# Patient Record
Sex: MALE | Race: WHITE | Employment: OTHER | URBAN - METROPOLITAN AREA
[De-identification: names, ages, dates, MRNs, and addresses within clinical notes are randomized per-mention and may not be internally consistent; named-entity substitution may affect disease eponyms.]

---

## 2017-12-05 ENCOUNTER — ANESTHESIA EVENT (OUTPATIENT)
Dept: ENDOSCOPY | Age: 72
End: 2017-12-05
Payer: MEDICARE

## 2017-12-06 ENCOUNTER — HOSPITAL ENCOUNTER (OUTPATIENT)
Age: 72
Setting detail: OUTPATIENT SURGERY
Discharge: HOME | End: 2017-12-06
Attending: INTERNAL MEDICINE | Admitting: INTERNAL MEDICINE
Payer: MEDICARE

## 2017-12-06 ENCOUNTER — ANESTHESIA (OUTPATIENT)
Dept: ENDOSCOPY | Age: 72
End: 2017-12-06
Payer: MEDICARE

## 2017-12-06 VITALS
HEIGHT: 72 IN | TEMPERATURE: 97 F | WEIGHT: 173 LBS | DIASTOLIC BLOOD PRESSURE: 77 MMHG | RESPIRATION RATE: 16 BRPM | BODY MASS INDEX: 23.43 KG/M2 | SYSTOLIC BLOOD PRESSURE: 109 MMHG | OXYGEN SATURATION: 98 % | HEART RATE: 60 BPM

## 2017-12-06 PROCEDURE — 77030009426 HC FCPS BIOP ENDOSC BSC -B: Performed by: INTERNAL MEDICINE

## 2017-12-06 PROCEDURE — 74011250636 HC RX REV CODE- 250/636: Performed by: NURSE ANESTHETIST, CERTIFIED REGISTERED

## 2017-12-06 PROCEDURE — 77030031670 HC DEV INFL ENDOTEK BIG60 MRTM -B: Performed by: INTERNAL MEDICINE

## 2017-12-06 PROCEDURE — 88305 TISSUE EXAM BY PATHOLOGIST: CPT | Performed by: INTERNAL MEDICINE

## 2017-12-06 PROCEDURE — 77030010936 HC CLP LIG BSC -C: Performed by: INTERNAL MEDICINE

## 2017-12-06 PROCEDURE — 76040000007: Performed by: INTERNAL MEDICINE

## 2017-12-06 PROCEDURE — 74011250636 HC RX REV CODE- 250/636

## 2017-12-06 PROCEDURE — 77030011640 HC PAD GRND REM COVD -A: Performed by: INTERNAL MEDICINE

## 2017-12-06 PROCEDURE — 76060000032 HC ANESTHESIA 0.5 TO 1 HR: Performed by: INTERNAL MEDICINE

## 2017-12-06 PROCEDURE — 77030034690 HC DEV ENDO SNR RETRV STRC -B: Performed by: INTERNAL MEDICINE

## 2017-12-06 RX ORDER — SODIUM CHLORIDE 0.9 % (FLUSH) 0.9 %
5-10 SYRINGE (ML) INJECTION AS NEEDED
Status: CANCELLED | OUTPATIENT
Start: 2017-12-06 | End: 2017-12-06

## 2017-12-06 RX ORDER — SODIUM CHLORIDE 0.9 % (FLUSH) 0.9 %
5-10 SYRINGE (ML) INJECTION EVERY 8 HOURS
Status: DISCONTINUED | OUTPATIENT
Start: 2017-12-06 | End: 2017-12-06 | Stop reason: HOSPADM

## 2017-12-06 RX ORDER — EPINEPHRINE 0.1 MG/ML
1 INJECTION INTRACARDIAC; INTRAVENOUS
Status: CANCELLED | OUTPATIENT
Start: 2017-12-06 | End: 2017-12-06

## 2017-12-06 RX ORDER — FLUMAZENIL 0.1 MG/ML
0.2 INJECTION INTRAVENOUS
Status: CANCELLED | OUTPATIENT
Start: 2017-12-06 | End: 2017-12-06

## 2017-12-06 RX ORDER — ATROPINE SULFATE 0.1 MG/ML
0.5 INJECTION INTRAVENOUS
Status: CANCELLED | OUTPATIENT
Start: 2017-12-06 | End: 2017-12-06

## 2017-12-06 RX ORDER — FAMOTIDINE 20 MG/1
20 TABLET, FILM COATED ORAL ONCE
Status: DISCONTINUED | OUTPATIENT
Start: 2017-12-07 | End: 2017-12-06 | Stop reason: HOSPADM

## 2017-12-06 RX ORDER — NALOXONE HYDROCHLORIDE 0.4 MG/ML
0.4 INJECTION, SOLUTION INTRAMUSCULAR; INTRAVENOUS; SUBCUTANEOUS
Status: CANCELLED | OUTPATIENT
Start: 2017-12-06 | End: 2017-12-06

## 2017-12-06 RX ORDER — SODIUM CHLORIDE, SODIUM LACTATE, POTASSIUM CHLORIDE, CALCIUM CHLORIDE 600; 310; 30; 20 MG/100ML; MG/100ML; MG/100ML; MG/100ML
50 INJECTION, SOLUTION INTRAVENOUS CONTINUOUS
Status: DISCONTINUED | OUTPATIENT
Start: 2017-12-07 | End: 2017-12-06 | Stop reason: HOSPADM

## 2017-12-06 RX ORDER — SODIUM CHLORIDE 0.9 % (FLUSH) 0.9 %
5-10 SYRINGE (ML) INJECTION EVERY 8 HOURS
Status: CANCELLED | OUTPATIENT
Start: 2017-12-06 | End: 2017-12-06

## 2017-12-06 RX ORDER — DEXTROMETHORPHAN/PSEUDOEPHED 2.5-7.5/.8
1.2 DROPS ORAL
Status: CANCELLED | OUTPATIENT
Start: 2017-12-06

## 2017-12-06 RX ORDER — INSULIN LISPRO 100 [IU]/ML
INJECTION, SOLUTION INTRAVENOUS; SUBCUTANEOUS ONCE
Status: DISCONTINUED | OUTPATIENT
Start: 2017-12-07 | End: 2017-12-06 | Stop reason: HOSPADM

## 2017-12-06 RX ORDER — ESCITALOPRAM OXALATE 10 MG/1
10 TABLET ORAL DAILY
COMMUNITY
End: 2018-01-11 | Stop reason: DRUGHIGH

## 2017-12-06 RX ORDER — PROPOFOL 10 MG/ML
INJECTION, EMULSION INTRAVENOUS AS NEEDED
Status: DISCONTINUED | OUTPATIENT
Start: 2017-12-06 | End: 2017-12-06 | Stop reason: HOSPADM

## 2017-12-06 RX ORDER — SODIUM CHLORIDE 0.9 % (FLUSH) 0.9 %
5-10 SYRINGE (ML) INJECTION AS NEEDED
Status: DISCONTINUED | OUTPATIENT
Start: 2017-12-06 | End: 2017-12-06 | Stop reason: HOSPADM

## 2017-12-06 RX ADMIN — PROPOFOL 20 MG: 10 INJECTION, EMULSION INTRAVENOUS at 10:31

## 2017-12-06 RX ADMIN — PROPOFOL 20 MG: 10 INJECTION, EMULSION INTRAVENOUS at 10:38

## 2017-12-06 RX ADMIN — PROPOFOL 120 MG: 10 INJECTION, EMULSION INTRAVENOUS at 10:11

## 2017-12-06 RX ADMIN — PROPOFOL 20 MG: 10 INJECTION, EMULSION INTRAVENOUS at 10:26

## 2017-12-06 RX ADMIN — PROPOFOL 20 MG: 10 INJECTION, EMULSION INTRAVENOUS at 10:14

## 2017-12-06 RX ADMIN — SODIUM CHLORIDE, SODIUM LACTATE, POTASSIUM CHLORIDE, AND CALCIUM CHLORIDE 50 ML/HR: 600; 310; 30; 20 INJECTION, SOLUTION INTRAVENOUS at 09:38

## 2017-12-06 NOTE — DISCHARGE INSTRUCTIONS
Patient Discharge Instructions    Nehemiah Maldonado / [id number] : 1945    Admitted 2017 Discharged: 2017         Procedure Impression:  1. Two 6mm ascending colon polyps removed  2. One 3mm transverse colon polyp removed  3. One 15mm pedunculated sigmoid polyp removed with clips placed across stalk  4. Four diminutive rectosigmoid polyps removed  5. Moderate pan-colonic diverticulosis  6. Large prolapsed internal hemorrhoids with 1cm anal polyp noted    Recommendation:  1. Resume regular diet, recommend high fiber  2. Will contact with polyp results in 2 weeks. These results will determine timing to next colon cancer screening. 3. Please contact our office if you have not received the results by three weeks. 4. Refer to Dr Marquita Espinosa for evaluation of the anal polyp    Recommended Diet: Regular Diet    Recommended Activity:    1. Do not drink alcohol, drive or operate machinery for 12 hours   2. Call if any fever, abdominal pain or bleeding noted. Signed By: Baudilio Perdomo MD     2017       Patient armband removed and given to patient to take home.   Patient was informed of the privacy risks if armband lost or stolen

## 2017-12-06 NOTE — IP AVS SNAPSHOT
Andrea Lopez 
 
 
 [address] 
[phone] Patient: Oriana Reyes MRN: [id number] FCG:3/1/7100 About your hospitalization You were admitted on:  December 6, 2017 You last received care in the:  [facility] PHASE 2 RECOVERY You were discharged on:  December 6, 2017 Why you were hospitalized Your primary diagnosis was:  Not on File Things You Need To Do (next 8 weeks) Call Sabas Epstein MD in 2 week(s) Phone:  [phone] Where:  [facility], [address] [facility] [address], [address] [address] Follow up with Yair Crabtree MD  
  
Phone:  [phone] Where:  [address], [address] [place] [address] Thursday Jan 11, 2018 Any with Navneet Chance MD at  1:45 PM  
Where:  [facility]. [address] [facility] Discharge Orders None A check stephanie indicates which time of day the medication should be taken. My Medications TAKE these medications as instructed Instructions Each Dose to Equal  
 Morning Noon Evening Bedtime  
 amLODIPine 10 mg tablet Commonly known as:  [place] Your last dose was: Your next dose is: Take  by mouth daily. aspirin delayed-release 81 mg tablet Your last dose was: Your next dose is: Take  by mouth daily. atorvastatin 20 mg tablet Commonly known as:  LIPITOR Your last dose was: Your next dose is: Take  by mouth daily. escitalopram oxalate 10 mg tablet Commonly known as:  [place]ty [place] Your last dose was: Your next dose is: Take 10 mg by mouth daily. 10 mg  
    
   
   
   
  
 folic acid 740 mcg tablet Your last dose was: Your next dose is: Take 400 mcg by mouth daily. 400 mcg lisinopril 40 mg tablet Commonly known as:  Ernesto Tafoya Your last dose was: Your next dose is: Take 40 mg by mouth daily. 40 mg  
    
   
   
   
  
 multivitamin with iron tablet Your last dose was: Your next dose is: Take 1 Tab by mouth daily. 1 Tab Discharge Instructions Patient Discharge Instructions Priscila Cornell / [id number] : 1945 Admitted 2017 Discharged: 2017 Procedure Impression: 1. Two 6mm ascending colon polyps removed 2. One 3mm transverse colon polyp removed 3. One 15mm pedunculated sigmoid polyp removed with clips placed across stalk 4. Four diminutive rectosigmoid polyps removed 5. Moderate pan-colonic diverticulosis 6. Large prolapsed internal hemorrhoids with 1cm anal polyp noted Recommendation: 1. Resume regular diet, recommend high fiber 2. Will contact with polyp results in 2 weeks. These results will determine timing to next colon cancer screening. 3. Please contact our office if you have not received the results by three weeks. 4. Refer to Dr Tootie Berger for evaluation of the anal polyp Recommended Diet: Regular Diet Recommended Activity: 1. Do not drink alcohol, drive or operate machinery for 12 hours 2. Call if any fever, abdominal pain or bleeding noted. Signed By: Sedrick Murray MD   
 2017 Patient armband removed and given to patient to take home. Patient was informed of the privacy risks if armband lost or stolen Introducing [facility] & HEALTH SERVICES! Aleksandra Novak introduces Dashbook patient portal. Now you can access parts of your medical record, email your doctor's office, and request medication refills online. 1. In your internet browser, go to [email or web address] 2. Click on the First Time User? Click Here link in the Sign In box. You will see the New Member Sign Up page. 3. Enter your De Correspondent Access Code exactly as it appears below. You will not need to use this code after youve completed the sign-up process. If you do not sign up before the expiration date, you must request a new code. · De Correspondent Access Code: [email or web address] Expires: 3/6/2018 11:05 AM 
 
4. Enter the last four digits of your Social Security Number (xxxx) and Date of Birth (mm/dd/yyyy) as indicated and click Submit. You will be taken to the next sign-up page. 5. Create a Alloptict ID. This will be your De Correspondent login ID and cannot be changed, so think of one that is secure and easy to remember. 6. Create a De Correspondent password. You can change your password at any time. 7. Enter your Password Reset Question and Answer. This can be used at a later time if you forget your password. 8. Enter your e-mail address. You will receive e-mail notification when new information is available in [address] 9. Click Sign Up. You can now view and download portions of your medical record. 10. Click the Download Summary menu link to download a portable copy of your medical information. If you have questions, please visit the Frequently Asked Questions section of the De Correspondent website. Remember, De Correspondent is NOT to be used for urgent needs. For medical emergencies, dial 911. Now available from your iPhone and Android! Providers Seen During Your Hospitalization Provider Specialty Primary office phone Machelle Early MD Gastroenterology [phone] Your Primary Care Physician (PCP) Primary Care Physician Office Phone Office Fax Cheryle Beal, [place] [address] [phone] You are allergic to the following No active allergies Recent Documentation Height Weight BMI Smoking Status 1.829 m 78.5 kg 23.46 kg/m2 Former Smoker Emergency Contacts Name Discharge Info Relation Home Work Mobile MitaDrea DISCHARGE CAREGIVER [3] Daughter [21] [phone] Patient Belongings The following personal items are in your possession at time of discharge: 
  Dental Appliances: Partials, Lowers, Uppers, With patient  Visual Aid: Glasses, With patient Please provide this summary of care documentation to your next provider. Signatures-by signing, you are acknowledging that this After Visit Summary has been reviewed with you and you have received a copy. Patient Signature:  ____________________________________________________________ Date:  ____________________________________________________________  
  
Nacho Mais Provider Signature:  ____________________________________________________________ Date:  ____________________________________________________________

## 2017-12-06 NOTE — ANESTHESIA POSTPROCEDURE EVALUATION
Post-Anesthesia Evaluation and Assessment    Patient: Maya Villegas MRN: [id number]  SSN: [id number]    YOB: 1945  Age: 71 y.o. Sex: male      Data from PACU flowsheet    Cardiovascular Function/Vital Signs  Visit Vitals    /77    Pulse 60    Temp 36.1 °C (97 °F)    Resp 16    Ht 6' (1.829 m)    Wt 78.5 kg (173 lb)    SpO2 98%    BMI 23.46 kg/m2       Patient is status post MAC anesthesia for Procedure(s):  COLONOSCOPY. Nausea/Vomiting: controlled    Postoperative hydration reviewed and adequate. Pain:  Pain Scale 1: Numeric (0 - 10) (12/06/17 1053)  Pain Intensity 1: 0 (12/06/17 1053)   Managed      Mental Status and Level of Consciousness: Alert and oriented     Pulmonary Status:   O2 Device: Room air (12/06/17 1047)   Adequate oxygenation and airway patent    Complications related to anesthesia: None    Post-anesthesia assessment completed.  No concerns    Signed By: Swapna Monzon MD     December 6, 2017

## 2017-12-06 NOTE — H&P
History and Physical    Genie [facility]  1945  [id number]  [id number]    Pre-Procedure Diagnosis:  screening  z12.11      Evaluation of past illnesses, surgeries, or injuries:   YES  Past Medical History:   Diagnosis Date    [facility]     Emphysema lung ([facility]     Hematuria due to acute cystitis     Hematuria, gross     Hypercholesterolemia     Hypertension     Memory impairment      History reviewed. No pertinent surgical history. Allergies:  No Known Allergies    Previous reactions to sedation/analgesia? NO    Review of current medications, supplement, herbals and nutraceuticals complete:  YES  Current Facility-Administered Medications   Medication Dose Route Frequency Provider Last Rate Last Dose    [START ON 12/7/2017] lactated Ringers infusion  50 mL/hr IntraVENous CONTINUOUS Franchester Brochure, CRNA 50 mL/hr at 12/06/17 0938 50 mL/hr at 12/06/17 0938    sodium chloride (NS) flush 5-10 mL  5-10 mL IntraVENous Q8H Franchester Brochure, CRNA        sodium chloride (NS) flush 5-10 mL  5-10 mL IntraVENous PRN Qasim Brochure, CRNA        [START ON 12/7/2017] famotidine (PEPCID) tablet 20 mg  20 mg Oral ONCE Franchester Brochure, CRNA        [START ON 12/7/2017] insulin lispro (HUMALOG) injection   SubCUTAneous ONCE Marianater Jorgechshayan, CRNA              Pertinent labs reviewed? YES    History of substance abuse? NO  Family History   Problem Relation Age of Onset    Family history unknown: Yes     Social History     Social History    Marital status:      Spouse name: N/A    Number of children: N/A    Years of education: N/A     Occupational History    Not on file.      Social History Main Topics    Smoking status: Former Smoker    Smokeless tobacco: Never Used    Alcohol use Yes    Drug use: No    Sexual activity: Not on file     Other Topics Concern    Not on file     Social History Narrative       Cardiac Status: WNL    Mental Status:  WNL ; h/o stroke    Pulmonary Status:  WNL    NPO:  >4    Assessment/Impression: Colon cancer screening    Plan of treatment: Colonoscopy        Sami Tran MD  12/6/2017  10:05 AM

## 2017-12-06 NOTE — PROCEDURES
Colonoscopy Report    Patient: Jey Henry MRN: [id number]  SSN: [id number]    YOB: 1945  Age: 71 y.o. Sex: male      Date of Procedure: 12/6/2017    IMPRESSION:  1. Two 6mm ascending colon polyps removed  2. One 3mm transverse colon polyp removed  3. One 15mm pedunculated sigmoid polyp removed with clips placed across stalk  4. Four diminutive rectosigmoid polyps removed  5. Moderate pan-colonic diverticulosis  6. Large prolapsed internal hemorrhoids with 1cm anal polyp noted    RECOMMENDATIONS:  1. Resume regular diet, Recommend high fiber. 2.   Will contact with polyp results in 2 weeks. These results will determine timing to next screening. Patient will be instructed to contact our office if they have not received the results by three weeks. 3.   Refer to Dr Lyly Ulloa for evaluation of the anal polyp    Indication:  Screening colonoscopy  Procedure Performed: Colonoscopy polypectomy (cold snare), polypectomy (snare cautery), clip placement  Endoscopist: Leslye Tsang MD  Assistant: Endoscopy Technician-1: Dorian Dalton  Endoscopy RN-1: Rebeca Ryder RN  ASA: See anesthesia report  Mallampati Score: See anesthesia report  Anesthesia: MAC anesthesia  Endoscope:     [x]  CF H 190AL   []  PCF H190AL   []  GIF H 190    Extent of Examination:terminal ileum  Quality of Preparation:     []  Excellent   [x]  Very Good   [] Fair but adequate   [] Fair, inadequate   []  Poor      Technique: The procedure was discussed with the patient including risks, benefits, alternatives including risks of IV sedation, bleeding, perforation and missed polyp. A safety timeout was performed. The patient was given incremental doses of intravenous sedation to achieve moderate sedation. The patients vital signs were monitored at all times including heart rate, rhythm, blood pressure and oxygen saturation. The patient was placed in left lateral position.    When adequate sedation was achieved a perianal inspection and a digital rectal exam were performed. Video colonoscope was introduced into the rectum and advanced under direct vision up to the terminal ileum. The cecum was identified by IC valve, appendiceal orifice and crows foot. With adequate insufflation and maneuvering of the withdrawing scope, the colonic mucosa was visualized carefully. Retroflexion was performed in the rectum and the distal rectum visualized. The patient tolerated the procedure very well and was transferred to recovery area. Findings:  1. Large prolapsed internal hemorrhoids were noted (Grade III) along with a 1cm anal polypoid lesion. He felt sharp pain with this was probed with the biopsy forceps and it appeared to arise from the anal mucosa and biopsy was not performed  2. Moderate pan-colonic diverticulosis was noted  3. Two 6mm semipedunculated ascending colon polyps were removed with snare cautery polypectomy  4. One 3mm sessile transverse colon polyp was removed with cold snare polypectomy  5. One 15mm pedunculated sigmoid polyp (25cm from the anal verge) on a thick stalk was removed with snare cautery polypectomy and 2 endoclips were placed across the polypectomy stalk to prevent bleeding  6. Four diminutive sessile hyperplastic appearing rectosigmoid polyps were removed with cold snare polypectomy  7.  The terminal ileum was normal to 5cm      EBL:Minimal  Specimen:   ID Type Source Tests Collected by Time Destination   1 : hot snare polyp Preservative Colon, Ascending  Jareth Cheng MD 12/6/2017 1015 Pathology   2 : cold snare polyp Preservative Colon, Transverse  Jareth Cheng MD 12/6/2017 1026 Pathology   3 : hot snare polyp Preservative Sigmoid  Jareth Cheng MD 12/6/2017 1030 Pathology   4 : cold snare polyp recto sigmoidx4 Preservative Sigmoid  Jareth Cheng MD 12/6/2017 1038 Pathology       Jareth Cheng MD  December 6, 2017  10:51 AM

## 2018-01-11 ENCOUNTER — TELEPHONE (OUTPATIENT)
Dept: SURGERY | Age: 73
End: 2018-01-11

## 2018-01-11 NOTE — TELEPHONE ENCOUNTER
LM on VM to call his PCP to request [facility] referral. Dr. Stephanie Calloway will see him on Monday, January 15th for Anal Polyps.

## 2018-01-12 ENCOUNTER — TELEPHONE (OUTPATIENT)
Dept: SURGERY | Age: 73
End: 2018-01-12

## 2018-01-15 ENCOUNTER — OFFICE VISIT (OUTPATIENT)
Dept: SURGERY | Age: 73
End: 2018-01-15

## 2018-01-15 VITALS — HEIGHT: 72 IN | WEIGHT: 180 LBS | BODY MASS INDEX: 24.38 KG/M2 | RESPIRATION RATE: 20 BRPM

## 2018-01-15 DIAGNOSIS — K64.8 HEMORRHOIDS WITH COMPLICATION: ICD-10-CM

## 2018-01-15 DIAGNOSIS — K62.89 HYPERTROPHIED ANAL PAPILLA: Primary | ICD-10-CM

## 2018-01-15 DIAGNOSIS — K62.0 ANAL POLYP: ICD-10-CM

## 2018-01-15 PROBLEM — Z12.11 ENCOUNTER FOR SCREENING FOR MALIGNANT NEOPLASM OF COLON: Status: ACTIVE | Noted: 2018-01-15

## 2018-01-15 PROBLEM — K63.5 COLON POLYPS: Status: ACTIVE | Noted: 2018-01-15

## 2018-01-15 RX ORDER — ATORVASTATIN CALCIUM 40 MG/1
TABLET, FILM COATED ORAL
COMMUNITY
End: 2018-02-27

## 2018-01-15 RX ORDER — LANOLIN ALCOHOL/MO/W.PET/CERES
400 CREAM (GRAM) TOPICAL DAILY
COMMUNITY
End: 2018-02-09

## 2018-01-15 RX ORDER — AMLODIPINE BESYLATE 10 MG/1
10 TABLET ORAL DAILY
COMMUNITY

## 2018-01-15 RX ORDER — LISINOPRIL 40 MG/1
40 TABLET ORAL DAILY
COMMUNITY
End: 2022-10-07 | Stop reason: SDUPTHER

## 2018-01-15 RX ORDER — ESCITALOPRAM OXALATE 10 MG/1
TABLET ORAL
COMMUNITY
End: 2018-02-27

## 2018-01-15 RX ORDER — ASPIRIN 81 MG/1
TABLET ORAL DAILY
COMMUNITY

## 2018-01-15 NOTE — MR AVS SNAPSHOT
[facility] 
 
 
 [address] [facility] [address] [address] 
[phone] Patient: Allie Thompson MRN: [id number] FTK:4/2/[birth year]65 Visit Information Date & Time Provider Department Dept. Phone Encounter #  
 1/15/2018 11:00 AM Erin Youssef  [facility] [phone] [id number] Your Appointments 2/21/2018  1:00 PM  
PRE OP with Erin Youssef MD  
[address] [place] [address]) Appt Note: hemorrhoidectomy 03-02-18  
 [address] [facility] [address] 3/14/2018  1:00 PM  
POST OP with Erin Youssef MD  
[address] [place] [address]) Appt Note: postop hemorrhoidectomy 03-02-18  
 [address] [facility] [address] [facility] [address]  
  
   
 [address] [place], [place] [address] [address] Upcoming Health Maintenance Date Due Hepatitis C Screening 1945 DTaP/Tdap/Td series (1 - Tdap) 9/3/1967 FOBT Q 1 YEAR AGE 50-75 9/3/1996 ZOSTER VACCINE AGE 60> 7/3/2006 GLAUCOMA SCREENING Q2Y 9/3/2011 Pneumococcal 65+ Low/Medium Risk (1 of 2 - PCV13) 9/3/2011 MEDICARE YEARLY EXAM 9/3/2011 Influenza Age 5 to Adult 8/1/2017 Allergies as of 1/15/2018  Review Complete On: 1/15/2018 By: Erin Youssef MD  
 No Known Allergies Current Immunizations  Never Reviewed No immunizations on file. Not reviewed this visit You Were Diagnosed With   
  
 Codes Comments Hypertrophied anal papilla    -  Primary ICD-10-CM: K62.89 ICD-9-CM: 569.49 Hemorrhoids with complication     LRP-47-OH: K64.8 ICD-9-CM: 455.8 Anal polyp     ICD-10-CM: K62.0 ICD-9-CM: 569.0 Vitals Resp Height(growth percentile) Weight(growth percentile) BMI Smoking Status 20 6' (1.829 m) 180 lb (81.6 kg) 24.41 kg/m2 Former Smoker BMI and BSA Data Body Mass Index Body Surface Area  
 24.41 kg/m 2 2.04 m 2 Your Updated Medication List  
  
   
This list is accurate as of: 1/15/18 12:45 PM.  Always use your most recent med list. amLODIPine 10 mg tablet Commonly known as:  NORVASC  
1 tab(s)  
  
 aspirin delayed-release 81 mg tablet Take  by mouth daily. atorvastatin 40 mg tablet Commonly known as:  LIPITOR  
1 tab(s) CENTRUM ULTRA MEN'S PO Take  by mouth. folic acid 396 mcg tablet Take 400 mcg by mouth daily. LEXAPRO 10 mg tablet Generic drug:  escitalopram oxalate 1 tab(s)  
  
 lisinopril 40 mg tablet Commonly known as:  Era Marychuy  
1 tab(s) We Performed the Following IN DIAGNOSTIC ANOSCOPY [id number] CPT(R)] Patient Instructions If you have any questions or concerns about today's appointment, the verbal and/or written instructions you were given for follow up care, please call our office at [phone]. [facility] [address], [address] 
 
[phone] office [phone] fax Vivian Hamilton [facility]  [address], [address] [facility] 
 
[phone] office main line [phone] main fax PATIENT PRE AND POST OPERATIVE INSTRUCTIONS Hospital: [address] [place] [address] 
[phone] Before Surgery Instructions:  
1) You must have someone available to drive you to and from your procedure and stay with you for the first 24 hours. 2) It is very important that you have nothing to eat or drink after midnight the night before your surgery. This includes chewing gum or sucking on hard candy. Take only heart, blood pressure and cholesterol medications the morning of surgery with only a sip of water. 3) Please stop taking Plavix 10 days prior to your surgery. Stop taking Coumadin 5 days prior to your surgery. Stop taking all Aspirin or Aspirin containing products 7 days prior to your surgery. Stop taking Advil, Motrin, Aleve, and etc. 3 days prior to your surgery. 4) If you take any diabetic medications please consult with your primary care physician on how to take them on the day of your surgery 5) Please stop all Herbal products 2 weeks prior to your surgery. 6) Please arrive at the hospital 2 hours prior to your surgery, unless you have been otherwise instructed. 7) Patients having an operation on their colon will be given a separate instruction sheet on their Bowel Prep. 8) For any pre-operative work up check in at the main entrance to [facility], and then go to Patient Registration. These studies are done on a walk in basis they are open from 7:00am to 5:00pm Monday through Friday. 9) Please wash your surgical site the morning of your surgery with soap and water. 10) If you are of child bearing age you will have pregnancy test done the morning of your surgery as soon as you arrive. 11) You may be contacted to change your surgery time. At times this is necessary due to equipment or staffing needs. Surgery Date and Time:         March 02,2018                 at     7:30     am      
 
Please check in at [facility], enter through the Emergency Room entrance and go up to the second floor. Please check in by  5:30       am  the day of your surgery. You may contact Tea Ames with any questions at [phone]. After Surgery Instructions: You will need to be seen in the office for a follow-up visit 7-14 days after your surgery. Please call after you have had the procedure to make this appointment. Unless otherwise instructed, you may remove your outer bandage and shower 48 hours after your surgery. If you develop a fever greater than 101, have any significant drainage, bleeding, swelling and/or pus of the wound. Please call our office immediately. Introducing \[facility]\"" & HEALTH SERVICES! [address] introduces 7fgame patient portal. Now you can access parts of your medical record, email your doctor's office, and request medication refills online. 1. In your internet browser, go to [email or web address] 2. Click on the First Time User? Click Here link in the Sign In box. You will see the New Member Sign Up page. 3. Enter your 7fgame Access Code exactly as it appears below. You will not need to use this code after youve completed the sign-up process. If you do not sign up before the expiration date, you must request a new code. · 7fgame Access Code: [email or web address] Expires: 4/15/2018 11:09 AM 
 
4. Enter the last four digits of your Social Security Number (xxxx) and Date of Birth (mm/dd/yyyy) as indicated and click Submit. You will be taken to the next sign-up page. 5. Create a 7fgame ID. This will be your 7fgame login ID and cannot be changed, so think of one that is secure and easy to remember. 6. Create a 7fgame password. You can change your password at any time. 7. Enter your Password Reset Question and Answer. This can be used at a later time if you forget your password. 8. Enter your e-mail address. You will receive e-mail notification when new information is available in [address] 9. Click Sign Up. You can now view and download portions of your medical record. 10. Click the Download Summary menu link to download a portable copy of your medical information. If you have questions, please visit the Frequently Asked Questions section of the 7fgame website. Remember, 7fgame is NOT to be used for urgent needs. For medical emergencies, dial 911. Now available from your iPhone and Android! Please provide this summary of care documentation to your next provider. Your primary care clinician is listed as Eleuterio Avery. If you have any questions after today's visit, please call [phone].

## 2018-01-15 NOTE — PROGRESS NOTES
[facility]  Colon and Rectal Surgery  [address] [address], [facility]5 [facility]              Colon and Rectal Surgery        Patient: Blanco Levi  MRN: [id number]  Date: 1/15/2018     Age:  71 y.o.,      Sex: male    YOB: 1945      Subjective    Mr. Steve Cruz is an 71 y.o. male referred by Dr. Anastacia Payne. His current symptoms include and discomfort and rare pain form a prolapsing anal mass.  reports symptoms have presented for 5 years. He has not had previous rectal surgery.  denies associated fever. A history of inflammatory bowel disease has not been reported. The patient underwent his first screening colonoscopy exam on 12/6/2017. At that time Dr. Anastacia Payne removed multiple adenomatous polyps. There was also a large prolapsed hemorrhoid with anal polyp noted. There was also colon diverticulosis. The patient denies any rectal bleeding, change in bowel habits, weight changes, nor any abdominal pain. Patient denies constipation, vomiting, diarrhea, bloody stools, mucousy stools, reflux and nausea. The family history is positive for colon cancer in his father. Past Medical History:   Diagnosis Date    AAA (abdominal aortic aneurysm) without rupture (HCC)     Cirrhosis of liver (HCC)     CVA (cerebral vascular accident) ([address]     Emphysema of lung ([facility]        Past Surgical History:   Procedure Laterality Date    HX COLONOSCOPY  12/06/2017       No Known Allergies    Prior to Admission medications    Medication Sig Start Date End Date Taking?  Authorizing Provider   amLODIPine (NORVASC) 10 mg tablet 1 tab(s)   Yes Historical Provider   atorvastatin (LIPITOR) 40 mg tablet 1 tab(s)   Yes Historical Provider   lisinopril (PRINIVIL, ZESTRIL) 40 mg tablet 1 tab(s)   Yes Historical Provider   escitalopram oxalate (LEXAPRO) 10 mg tablet 1 tab(s)   Yes Historical Provider   aspirin delayed-release 81 mg tablet Take  by mouth daily. Yes Historical Provider   MULTIVIT-MINS/IRON/FOLIC/LYCOP (CENTRUM ULTRA MEN'S PO) Take  by mouth. Yes Historical Provider   folic acid 958 mcg tablet Take 400 mcg by mouth daily. Yes Historical Provider       Current Outpatient Prescriptions   Medication Sig Dispense Refill    amLODIPine (NORVASC) 10 mg tablet 1 tab(s)      atorvastatin (LIPITOR) 40 mg tablet 1 tab(s)      lisinopril (PRINIVIL, ZESTRIL) 40 mg tablet 1 tab(s)      escitalopram oxalate (LEXAPRO) 10 mg tablet 1 tab(s)      aspirin delayed-release 81 mg tablet Take  by mouth daily.  MULTIVIT-MINS/IRON/FOLIC/LYCOP (CENTRUM ULTRA MEN'S PO) Take  by mouth.  folic acid 800 mcg tablet Take 400 mcg by mouth daily. Social History     Social History    Marital status: UNKNOWN     Spouse name: N/A    Number of children: N/A    Years of education: N/A     Occupational History    Not on file.      Social History Main Topics    Smoking status: Former Smoker     Types: Cigarettes    Smokeless tobacco: Never Used    Alcohol use Yes    Drug use: Not on file    Sexual activity: Not on file     Other Topics Concern    Not on file     Social History Narrative    No narrative on file       Family History   Problem Relation Age of Onset    Hypertension Mother     Heart Disease Mother     Stroke Mother     Heart Disease Father     Elevated Lipids Father     Cancer Father 36     colon           Review of Systems:    A comprehensive review of systems was negative except for: Ears, nose, mouth, throat, and face: positive for hearing loss  Respiratory: positive for cough  Musculoskeletal: positive for stiff joints    Objective:        Visit Vitals    Resp 20    Ht 6' (1.829 m)    Wt 81.6 kg (180 lb)    BMI 24.41 kg/m2       Physical Exam:   GENERAL: alert, cooperative, no distress, appears stated age  LUNG: clear to auscultation bilaterally  HEART: regular rate and rhythm  EXTREMITIES:  extremities normal, atraumatic, no cyanosis or edema     Anorectal:  With the patient in the prone position the anus appeared abnormal with findings of a large 1.5 cm prolapsing hypertrophied anal papillae in the left lateral quadrant. Digital rectal examination revealed Normal sphincter tone and squeeze pressure. Palpation revealed No Masses. Anoscopy revealed the large hypertrophied anal papillae in the left lateral quadrant lying on an enlarged inflamed internal hemorrhoid disease. There were also moderate grade 1 internal hemorrhoid disease in the other quadrants. Assessment / Plan    Mr. Remy Mckinley is an 71 y.o. male with a large hypertrophied anal papillae with associated hemorrhoid disease. I discussed the treatment options with the patient carefully. Given the severity and chronicity of the disease, I recommended surgical management. The patient was in full agreement. I discussed the details of the procedure as well as the risks of surgery including bleeding, infection, pain, anesthesia complications, possibility of recurrent disease, and the possibility of anal incontinence with any anal surgery. The patient is willing to accept the risks and would like to proceed with the surgery. Thank you for allowing me to participate in the patient's care.           Vidya Lopez MD, FACS, FASCRS  Colon and Rectal Surgery  [facility]  Office [phone]  Fax     [phone]  1/15/2018  12:15 PM

## 2018-01-15 NOTE — PATIENT INSTRUCTIONS
If you have any questions or concerns about today's appointment, the verbal and/or written instructions you were given for follow up care, please call our office at [phone]. [facility] - [facility]  [address] [address], [facility]    [phone] office  [phone] fax      . Corrinne Dresser [facility] - [facility]  [id number] [place] [address] Road    [phone] office main line  [phone] main fax                  PATIENT PRE AND POST [address]: [address]  [phone]    Before Surgery Instructions:   1) You must have someone available to drive you to and from your procedure and stay with you for the first 24 hours. 2) It is very important that you have nothing to eat or drink after midnight the night before your surgery. This includes chewing gum or sucking on hard candy. Take only heart, blood pressure and cholesterol medications the morning of surgery with only a sip of water. 3) Please stop taking Plavix 10 days prior to your surgery. Stop taking Coumadin 5 days prior to your surgery. Stop taking all Aspirin or Aspirin containing products 7 days prior to your surgery. Stop taking Advil, Motrin, Aleve, and etc. 3 days prior to your surgery. 4) If you take any diabetic medications please consult with your primary care physician on how to take them on the day of your surgery  5) Please stop all Herbal products 2 weeks prior to your surgery. 6) Please arrive at the hospital 2 hours prior to your surgery, unless you have been otherwise instructed. 7) Patients having an operation on their colon will be given a separate instruction sheet on their Bowel Prep. 8) For any pre-operative work up check in at the main entrance to [facility], and then go to Patient Registration.  These studies are done on a walk in basis they are open from 7:00am to 5:00pm Monday through Friday. 9) Please wash your surgical site the morning of your surgery with soap and water. 10) If you are of child bearing age you will have pregnancy test done the morning of your surgery as soon as you arrive. 11) You may be contacted to change your surgery time. At times this is necessary due to equipment or staffing needs. Surgery Date and Time:         March 02,2018                 at     7:30     am         Please check in at [facility], enter through the Emergency Room entrance and go up to the second floor. Please check in by  5:30       am  the day of your surgery. You may contact Tea Ames with any questions at [phone]. After Surgery Instructions: You will need to be seen in the office for a follow-up visit 7-14 days after your surgery. Please call after you have had the procedure to make this appointment. Unless otherwise instructed, you may remove your outer bandage and shower 48 hours after your surgery. If you develop a fever greater than 101, have any significant drainage, bleeding, swelling and/or pus of the wound. Please call our office immediately.

## 2018-02-21 ENCOUNTER — OFFICE VISIT (OUTPATIENT)
Dept: SURGERY | Age: 73
End: 2018-02-21

## 2018-02-21 VITALS
BODY MASS INDEX: 25.06 KG/M2 | RESPIRATION RATE: 20 BRPM | HEIGHT: 72 IN | SYSTOLIC BLOOD PRESSURE: 144 MMHG | TEMPERATURE: 97.8 F | WEIGHT: 185 LBS | DIASTOLIC BLOOD PRESSURE: 102 MMHG | HEART RATE: 80 BPM

## 2018-02-21 DIAGNOSIS — Z01.818 PREOP EXAMINATION: Primary | ICD-10-CM

## 2018-02-21 NOTE — PROGRESS NOTES
Carol Camp is a 71 y.o. male who presents today with   Chief Complaint   Patient presents with    Pre-op Exam     Pt presents today to discuss upcoming hemorrhoidectomy to be done 3/2/2018                1. Have you been to the ER, urgent care clinic since your last visit? Hospitalized since your last visit? No    2. Have you seen or consulted any other health care providers outside of the [address] [place] [address] since your last visit? Include any pap smears or colon screening.  No

## 2018-02-21 NOTE — PROGRESS NOTES
[facility]  [address] [facility], [address] [place] [address], [facility]5 [facility]  [phone]           Colon and Rectal Surgery History and Physical    Patient: Rogelio Taylor  MRN: [id number]  SSN: [id number]   YOB: 1945  Age: 71 y.o. Sex: male     Patient scheduled for: Exam under anesthesia, Hemorrhoidectomy  Date of surgery: 03/02/2018  Diagnosis: large hypertrophied anal papillae with associated hemorrhoid disease    Chief Complaint:  Discomfort and rare pain form a prolapsing anal mass. History:    Past Medical History:   Diagnosis Date    AAA (abdominal aortic aneurysm) without rupture (HCC)     Abdominal aortic aneurysm (AAA) ([address] 12/2017    Benign non-nodular prostatic hyperplasia with lower urinary tract symptoms     BPH with obstruction/lower urinary tract symptoms     Cerebral artery occlusion with cerebral infarction ([address]     Cirrhosis of liver (HCC)     CVA (cerebral vascular accident) ([address]     Elevated PSA     Emphysema lung (HCC)     Emphysema of lung (HCC)     Hematuria due to acute cystitis     Hematuria, gross     History of UTI     Hypercholesterolemia     Hypertension     Memory impairment     Stopped smoking with greater than 40 pack year history      Past Surgical History:   Procedure Laterality Date    COLONOSCOPY N/A 12/6/2017    COLONOSCOPY performed by Zhou Mariscal MD at [address] [place] [address] COLONOSCOPY  12/06/2017     No Known Allergies  Current Outpatient Prescriptions   Medication Sig Dispense Refill    amLODIPine (NORVASC) 10 mg tablet 1 tab(s)      atorvastatin (LIPITOR) 40 mg tablet 1 tab(s)      lisinopril (PRINIVIL, ZESTRIL) 40 mg tablet 1 tab(s)      escitalopram oxalate (LEXAPRO) 10 mg tablet 1 tab(s)      aspirin delayed-release 81 mg tablet Take  by mouth daily.  MULTIVIT-MINS/IRON/FOLIC/LYCOP (CENTRUM ULTRA MEN'S PO) Take  by mouth.       ciclopirox (LOPROX) 0.77 % topical cream Apply  to affected area two (2) times a day.  folic acid 921 mcg tablet Take 400 mcg by mouth daily.  levoFLOXacin (LEVAQUIN) 750 mg tablet Take 1 tab by mouth 2 hours prior to procedure 1 Tab 0         Physical Examination    Vitals:    02/21/18 1314 02/21/18 1320   BP: (!) 144/104 (!) 144/102   Pulse: 86 80   Resp: 20    Temp: 97.8 °F (36.6 °C)    TempSrc: Oral    Weight: 83.9 kg (185 lb)    Height: 6' (1.829 m)         Physical Exam:   GENERAL: alert, cooperative, no distress, appears stated age  LUNG: clear to auscultation bilaterally  HEART: regular rate and rhythm, S1, S2 normal, no murmur, click, rub or gallop  ABDOMEN: soft, non-tender. Bowel sounds normal. No masses,  no organomegaly  NEUROLOGIC: negative      Assessment and Plan:  Shante Siddiqui is an 71 y.o. male with a large hypertrophied anal papillae with associated hemorrhoid disease.      Dr. Serina Musa discussed the treatment options with the patient carefully. Given the severity and chronicity of the disease, the recommendation is surgical management. The patient was in full agreement.     We discussed the details of the procedure as well as the risks of surgery including bleeding, infection, pain, anesthesia complications, possibility of recurrent disease, and the possibility of anal incontinence with any anal surgery.   The patient is willing to accept the risks and would like to proceed with the surgery.     Hemorrhoidectomy scheduled for  03/02/2018        CY Seo  Colon and Rectal Surgery  [facility]  Office [phone]  2/21/2018  1:44 PM     Eliseo

## 2018-02-21 NOTE — PATIENT INSTRUCTIONS
If you have any questions or concerns about today's appointment, the verbal and/or written instructions you were given for follow up care, please call our office at [phone]. [address] [facility] - [address], [address] [place] [address]    [phone] office  [phone]

## 2018-02-26 ENCOUNTER — HOSPITAL ENCOUNTER (OUTPATIENT)
Dept: PREADMISSION TESTING | Age: 73
Discharge: HOME | End: 2018-02-26
Payer: MEDICARE

## 2018-02-26 PROBLEM — I10 ESSENTIAL HYPERTENSION: Status: ACTIVE | Noted: 2017-09-08

## 2018-02-26 PROBLEM — D22.9 NUMEROUS MOLES: Status: ACTIVE | Noted: 2017-09-08

## 2018-02-26 PROBLEM — K57.90 DIVERTICULOSIS OF INTESTINE: Status: ACTIVE | Noted: 2017-09-25

## 2018-02-26 PROBLEM — Z86.73 HISTORY OF STROKE: Status: ACTIVE | Noted: 2017-09-08

## 2018-02-26 PROBLEM — N30.01 HEMATURIA DUE TO ACUTE CYSTITIS: Status: ACTIVE | Noted: 2017-08-09

## 2018-02-26 PROBLEM — H93.13 TINNITUS OF BOTH EARS: Status: ACTIVE | Noted: 2017-09-08

## 2018-02-26 PROBLEM — H91.93 BILATERAL HEARING LOSS: Status: ACTIVE | Noted: 2017-09-08

## 2018-02-26 PROBLEM — R31.29 MICROSCOPIC HEMATURIA: Status: ACTIVE | Noted: 2017-09-08

## 2018-02-26 PROBLEM — R41.82 ALTERED MENTAL STATUS: Status: ACTIVE | Noted: 2017-08-05

## 2018-02-26 PROBLEM — B35.1 ONYCHOMYCOSIS: Status: ACTIVE | Noted: 2017-09-08

## 2018-02-26 PROBLEM — F10.10 ETOH ABUSE: Status: ACTIVE | Noted: 2017-08-05

## 2018-02-26 PROBLEM — R41.3 MEMORY PROBLEM: Status: ACTIVE | Noted: 2017-12-01

## 2018-02-26 PROBLEM — J43.9 PULMONARY EMPHYSEMA (HCC): Status: ACTIVE | Noted: 2017-12-01

## 2018-02-26 PROBLEM — F32.A DEPRESSION: Status: ACTIVE | Noted: 2017-09-08

## 2018-02-26 PROBLEM — R41.82 ALTERED MENTAL STATUS, UNSPECIFIED: Status: ACTIVE | Noted: 2017-08-06

## 2018-02-26 PROBLEM — R91.1 PULMONARY NODULE: Status: ACTIVE | Noted: 2017-09-25

## 2018-02-26 PROBLEM — I10 ACCELERATED HYPERTENSION: Status: ACTIVE | Noted: 2017-08-05

## 2018-02-26 PROBLEM — Z87.891 EX-SMOKER: Status: ACTIVE | Noted: 2017-09-08

## 2018-02-26 PROBLEM — F17.200 NICOTINE DEPENDENCE: Status: ACTIVE | Noted: 2017-08-05

## 2018-02-26 PROBLEM — I63.9 ACUTE ISCHEMIC STROKE (HCC): Status: ACTIVE | Noted: 2017-08-09

## 2018-02-26 PROBLEM — I77.819 AORTIC ECTASIA (HCC): Status: ACTIVE | Noted: 2017-09-25

## 2018-02-26 LAB
ALBUMIN SERPL-MCNC: 4 G/DL (ref 3.4–5)
ALBUMIN/GLOB SERPL: 1.2 {RATIO} (ref 0.8–1.7)
ALP SERPL-CCNC: 81 U/L (ref 45–117)
ALT SERPL-CCNC: 32 U/L (ref 16–61)
ANION GAP SERPL CALC-SCNC: 4 MMOL/L (ref 3–18)
AST SERPL-CCNC: 18 U/L (ref 15–37)
BILIRUB SERPL-MCNC: 0.7 MG/DL (ref 0.2–1)
BUN SERPL-MCNC: 26 MG/DL (ref 7–18)
BUN/CREAT SERPL: 25 (ref 12–20)
CALCIUM SERPL-MCNC: 9.3 MG/DL (ref 8.5–10.1)
CHLORIDE SERPL-SCNC: 109 MMOL/L (ref 100–108)
CO2 SERPL-SCNC: 30 MMOL/L (ref 21–32)
CREAT SERPL-MCNC: 1.04 MG/DL (ref 0.6–1.3)
ERYTHROCYTE [DISTWIDTH] IN BLOOD BY AUTOMATED COUNT: 13.7 % (ref 11.6–14.5)
GLOBULIN SER CALC-MCNC: 3.4 G/DL (ref 2–4)
GLUCOSE SERPL-MCNC: 94 MG/DL (ref 74–99)
HCT VFR BLD AUTO: 41.4 % (ref 36–48)
HGB BLD-MCNC: 13.7 G/DL (ref 13–16)
MCH RBC QN AUTO: 30.1 PG (ref 24–34)
MCHC RBC AUTO-ENTMCNC: 33.1 G/DL (ref 31–37)
MCV RBC AUTO: 91 FL (ref 74–97)
PLATELET # BLD AUTO: 210 K/UL (ref 135–420)
PMV BLD AUTO: 11.4 FL (ref 9.2–11.8)
POTASSIUM SERPL-SCNC: 4.8 MMOL/L (ref 3.5–5.5)
PROT SERPL-MCNC: 7.4 G/DL (ref 6.4–8.2)
RBC # BLD AUTO: 4.55 M/UL (ref 4.7–5.5)
SODIUM SERPL-SCNC: 143 MMOL/L (ref 136–145)
WBC # BLD AUTO: 8.9 K/UL (ref 4.6–13.2)

## 2018-02-26 PROCEDURE — 36415 COLL VENOUS BLD VENIPUNCTURE: CPT | Performed by: COLON & RECTAL SURGERY

## 2018-02-26 PROCEDURE — 80053 COMPREHEN METABOLIC PANEL: CPT | Performed by: COLON & RECTAL SURGERY

## 2018-02-26 PROCEDURE — 85027 COMPLETE CBC AUTOMATED: CPT | Performed by: COLON & RECTAL SURGERY

## 2018-02-27 RX ORDER — ESCITALOPRAM OXALATE 10 MG/1
15 TABLET ORAL DAILY
COMMUNITY

## 2018-02-27 RX ORDER — ATORVASTATIN CALCIUM 20 MG/1
20 TABLET, FILM COATED ORAL DAILY
COMMUNITY

## 2018-03-01 ENCOUNTER — ANESTHESIA EVENT (OUTPATIENT)
Dept: SURGERY | Age: 73
End: 2018-03-01
Payer: MEDICARE

## 2018-03-02 ENCOUNTER — ANESTHESIA (OUTPATIENT)
Dept: SURGERY | Age: 73
End: 2018-03-02
Payer: MEDICARE

## 2018-03-02 ENCOUNTER — HOSPITAL ENCOUNTER (OUTPATIENT)
Age: 73
Setting detail: OUTPATIENT SURGERY
Discharge: HOME | End: 2018-03-02
Attending: COLON & RECTAL SURGERY | Admitting: COLON & RECTAL SURGERY
Payer: MEDICARE

## 2018-03-02 VITALS
TEMPERATURE: 96.8 F | RESPIRATION RATE: 12 BRPM | OXYGEN SATURATION: 95 % | DIASTOLIC BLOOD PRESSURE: 86 MMHG | WEIGHT: 184.06 LBS | SYSTOLIC BLOOD PRESSURE: 132 MMHG | HEIGHT: 72 IN | BODY MASS INDEX: 24.93 KG/M2 | HEART RATE: 76 BPM

## 2018-03-02 DIAGNOSIS — K62.89 HYPERTROPHIED ANAL PAPILLA: Primary | ICD-10-CM

## 2018-03-02 DIAGNOSIS — K64.8 HEMORRHOIDS WITH COMPLICATION: ICD-10-CM

## 2018-03-02 LAB
ATRIAL RATE: 76 BPM
CALCULATED P AXIS, ECG09: 35 DEGREES
CALCULATED R AXIS, ECG10: 13 DEGREES
CALCULATED T AXIS, ECG11: 58 DEGREES
DIAGNOSIS, 93000: NORMAL
P-R INTERVAL, ECG05: 162 MS
Q-T INTERVAL, ECG07: 406 MS
QRS DURATION, ECG06: 88 MS
QTC CALCULATION (BEZET), ECG08: 456 MS
VENTRICULAR RATE, ECG03: 76 BPM

## 2018-03-02 PROCEDURE — 77030018836 HC SOL IRR NACL ICUM -A: Performed by: COLON & RECTAL SURGERY

## 2018-03-02 PROCEDURE — 76210000063 HC OR PH I REC FIRST 0.5 HR: Performed by: COLON & RECTAL SURGERY

## 2018-03-02 PROCEDURE — 77030018823 HC SLV COMPR VENO -B: Performed by: COLON & RECTAL SURGERY

## 2018-03-02 PROCEDURE — 93005 ELECTROCARDIOGRAM TRACING: CPT

## 2018-03-02 PROCEDURE — 77030034115 HC SHR ENDO COAG HARM FOCS J&J -F: Performed by: COLON & RECTAL SURGERY

## 2018-03-02 PROCEDURE — 76210000022 HC REC RM PH II 1.5 TO 2 HR: Performed by: COLON & RECTAL SURGERY

## 2018-03-02 PROCEDURE — 76060000033 HC ANESTHESIA 1 TO 1.5 HR: Performed by: COLON & RECTAL SURGERY

## 2018-03-02 PROCEDURE — 74011250636 HC RX REV CODE- 250/636

## 2018-03-02 PROCEDURE — 74011000250 HC RX REV CODE- 250: Performed by: COLON & RECTAL SURGERY

## 2018-03-02 PROCEDURE — 77030011640 HC PAD GRND REM COVD -A: Performed by: COLON & RECTAL SURGERY

## 2018-03-02 PROCEDURE — 76010000149 HC OR TIME 1 TO 1.5 HR: Performed by: COLON & RECTAL SURGERY

## 2018-03-02 PROCEDURE — 74011250636 HC RX REV CODE- 250/636: Performed by: NURSE ANESTHETIST, CERTIFIED REGISTERED

## 2018-03-02 PROCEDURE — 77030002888 HC SUT CHRMC J&J -A: Performed by: COLON & RECTAL SURGERY

## 2018-03-02 PROCEDURE — 74011000250 HC RX REV CODE- 250

## 2018-03-02 PROCEDURE — 77030011265 HC ELECTRD BLD HEX COVD -A: Performed by: COLON & RECTAL SURGERY

## 2018-03-02 PROCEDURE — 88304 TISSUE EXAM BY PATHOLOGIST: CPT | Performed by: COLON & RECTAL SURGERY

## 2018-03-02 PROCEDURE — 77030032490 HC SLV COMPR SCD KNE COVD -B: Performed by: COLON & RECTAL SURGERY

## 2018-03-02 RX ORDER — FENTANYL CITRATE 50 UG/ML
25 INJECTION, SOLUTION INTRAMUSCULAR; INTRAVENOUS AS NEEDED
Status: CANCELLED | OUTPATIENT
Start: 2018-03-02

## 2018-03-02 RX ORDER — PROPOFOL 10 MG/ML
INJECTION, EMULSION INTRAVENOUS AS NEEDED
Status: DISCONTINUED | OUTPATIENT
Start: 2018-03-02 | End: 2018-03-02 | Stop reason: HOSPADM

## 2018-03-02 RX ORDER — ONDANSETRON 2 MG/ML
4 INJECTION INTRAMUSCULAR; INTRAVENOUS
Status: CANCELLED | OUTPATIENT
Start: 2018-03-02

## 2018-03-02 RX ORDER — HYDROCODONE BITARTRATE AND ACETAMINOPHEN 5; 325 MG/1; MG/1
1 TABLET ORAL AS NEEDED
Status: CANCELLED | OUTPATIENT
Start: 2018-03-02

## 2018-03-02 RX ORDER — PROPOFOL 10 MG/ML
INJECTION, EMULSION INTRAVENOUS
Status: DISCONTINUED | OUTPATIENT
Start: 2018-03-02 | End: 2018-03-02 | Stop reason: HOSPADM

## 2018-03-02 RX ORDER — LIDOCAINE HYDROCHLORIDE AND EPINEPHRINE 10; 10 MG/ML; UG/ML
INJECTION, SOLUTION INFILTRATION; PERINEURAL AS NEEDED
Status: DISCONTINUED | OUTPATIENT
Start: 2018-03-02 | End: 2018-03-02 | Stop reason: HOSPADM

## 2018-03-02 RX ORDER — OXYCODONE AND ACETAMINOPHEN 5; 325 MG/1; MG/1
1 TABLET ORAL
Qty: 20 TAB | Refills: 0 | Status: SHIPPED | OUTPATIENT
Start: 2018-03-02 | End: 2018-07-23

## 2018-03-02 RX ORDER — BUPIVACAINE HYDROCHLORIDE 2.5 MG/ML
INJECTION, SOLUTION EPIDURAL; INFILTRATION; INTRACAUDAL AS NEEDED
Status: DISCONTINUED | OUTPATIENT
Start: 2018-03-02 | End: 2018-03-02 | Stop reason: HOSPADM

## 2018-03-02 RX ORDER — HYDROMORPHONE HYDROCHLORIDE 2 MG/ML
0.5 INJECTION, SOLUTION INTRAMUSCULAR; INTRAVENOUS; SUBCUTANEOUS
Status: CANCELLED | OUTPATIENT
Start: 2018-03-02

## 2018-03-02 RX ORDER — SODIUM CHLORIDE, SODIUM LACTATE, POTASSIUM CHLORIDE, CALCIUM CHLORIDE 600; 310; 30; 20 MG/100ML; MG/100ML; MG/100ML; MG/100ML
75 INJECTION, SOLUTION INTRAVENOUS CONTINUOUS
Status: DISCONTINUED | OUTPATIENT
Start: 2018-03-02 | End: 2018-03-02 | Stop reason: HOSPADM

## 2018-03-02 RX ORDER — SODIUM CHLORIDE, SODIUM LACTATE, POTASSIUM CHLORIDE, CALCIUM CHLORIDE 600; 310; 30; 20 MG/100ML; MG/100ML; MG/100ML; MG/100ML
50 INJECTION, SOLUTION INTRAVENOUS CONTINUOUS
Status: CANCELLED | OUTPATIENT
Start: 2018-03-02

## 2018-03-02 RX ORDER — LIDOCAINE HYDROCHLORIDE 20 MG/ML
INJECTION, SOLUTION EPIDURAL; INFILTRATION; INTRACAUDAL; PERINEURAL AS NEEDED
Status: DISCONTINUED | OUTPATIENT
Start: 2018-03-02 | End: 2018-03-02 | Stop reason: HOSPADM

## 2018-03-02 RX ADMIN — LIDOCAINE HYDROCHLORIDE 40 MG: 20 INJECTION, SOLUTION EPIDURAL; INFILTRATION; INTRACAUDAL; PERINEURAL at 07:58

## 2018-03-02 RX ADMIN — SODIUM CHLORIDE, SODIUM LACTATE, POTASSIUM CHLORIDE, AND CALCIUM CHLORIDE 75 ML/HR: 600; 310; 30; 20 INJECTION, SOLUTION INTRAVENOUS at 07:18

## 2018-03-02 RX ADMIN — PROPOFOL 30 MG: 10 INJECTION, EMULSION INTRAVENOUS at 07:59

## 2018-03-02 RX ADMIN — PROPOFOL 40 MG: 10 INJECTION, EMULSION INTRAVENOUS at 08:24

## 2018-03-02 RX ADMIN — PROPOFOL 50 MCG/KG/MIN: 10 INJECTION, EMULSION INTRAVENOUS at 08:03

## 2018-03-02 RX ADMIN — PROPOFOL 30 MG: 10 INJECTION, EMULSION INTRAVENOUS at 08:01

## 2018-03-02 NOTE — H&P (VIEW-ONLY)
[facility]  [facility], [address] [facility], [facility]5 [facility]  [phone]           Colon and Rectal Surgery History and Physical    Patient: Shakila Corcoran  MRN: [id number]  SSN: [id number]   YOB: 1945  Age: 71 y.o. Sex: male     Patient scheduled for: Exam under anesthesia, Hemorrhoidectomy  Date of surgery: 03/02/2018  Diagnosis: large hypertrophied anal papillae with associated hemorrhoid disease    Chief Complaint:  Discomfort and rare pain form a prolapsing anal mass. History:    Past Medical History:   Diagnosis Date    AAA (abdominal aortic aneurysm) without rupture (HCC)     Abdominal aortic aneurysm (AAA) ([address] 12/2017    Benign non-nodular prostatic hyperplasia with lower urinary tract symptoms     BPH with obstruction/lower urinary tract symptoms     Cerebral artery occlusion with cerebral infarction ([address]     Cirrhosis of liver (HCC)     CVA (cerebral vascular accident) ([address]     Elevated PSA     Emphysema lung (HCC)     Emphysema of lung (HCC)     Hematuria due to acute cystitis     Hematuria, gross     History of UTI     Hypercholesterolemia     Hypertension     Memory impairment     Stopped smoking with greater than 40 pack year history      Past Surgical History:   Procedure Laterality Date    COLONOSCOPY N/A 12/6/2017    COLONOSCOPY performed by Dalia Isaac MD at [address] [place] [address] COLONOSCOPY  12/06/2017     No Known Allergies  Current Outpatient Prescriptions   Medication Sig Dispense Refill    amLODIPine (NORVASC) 10 mg tablet 1 tab(s)      atorvastatin (LIPITOR) 40 mg tablet 1 tab(s)      lisinopril (PRINIVIL, ZESTRIL) 40 mg tablet 1 tab(s)      escitalopram oxalate (LEXAPRO) 10 mg tablet 1 tab(s)      aspirin delayed-release 81 mg tablet Take  by mouth daily.  MULTIVIT-MINS/IRON/FOLIC/LYCOP (CENTRUM ULTRA MEN'S PO) Take  by mouth.       ciclopirox (LOPROX) 0.77 % topical cream Apply  to affected area two (2) times a day.  folic acid 163 mcg tablet Take 400 mcg by mouth daily.  levoFLOXacin (LEVAQUIN) 750 mg tablet Take 1 tab by mouth 2 hours prior to procedure 1 Tab 0         Physical Examination    Vitals:    02/21/18 1314 02/21/18 1320   BP: (!) 144/104 (!) 144/102   Pulse: 86 80   Resp: 20    Temp: 97.8 °F (36.6 °C)    TempSrc: Oral    Weight: 83.9 kg (185 lb)    Height: 6' (1.829 m)         Physical Exam:   GENERAL: alert, cooperative, no distress, appears stated age  LUNG: clear to auscultation bilaterally  HEART: regular rate and rhythm, S1, S2 normal, no murmur, click, rub or gallop  ABDOMEN: soft, non-tender. Bowel sounds normal. No masses,  no organomegaly  NEUROLOGIC: negative      Assessment and Plan:  Jesus Manuel Burch is an 71 y.o. male with a large hypertrophied anal papillae with associated hemorrhoid disease.      Dr. Faheem Guevara discussed the treatment options with the patient carefully. Given the severity and chronicity of the disease, the recommendation is surgical management. The patient was in full agreement.     We discussed the details of the procedure as well as the risks of surgery including bleeding, infection, pain, anesthesia complications, possibility of recurrent disease, and the possibility of anal incontinence with any anal surgery.   The patient is willing to accept the risks and would like to proceed with the surgery.     Hemorrhoidectomy scheduled for  03/02/2018        Lady Arango, RAQUEL-BC  Colon and Rectal Surgery  [facility]  Office [phone]  2/21/2018  1:44 PM     Eliseo

## 2018-03-02 NOTE — INTERVAL H&P NOTE
H&P Update:  Kim Evans was seen and examined. History and physical has been reviewed. The patient has been examined.  There have been no significant clinical changes since the completion of the originally dated History and Physical.    Signed By: Quinton Ahn MD     March 2, 2018 7:52 AM

## 2018-03-02 NOTE — ANESTHESIA POSTPROCEDURE EVALUATION
Post-Anesthesia Evaluation and Assessment    Patient: Brian Elena MRN: [id number]  SSN: [id number]    YOB: 1945  Age: 71 y.o. Sex: male      Data from PACU flowsheet    Cardiovascular Function/Vital Signs  Visit Vitals    /72    Pulse 78    Temp 36 °C (96.8 °F)    Resp 12    Ht 6' (1.829 m)    Wt 83.5 kg (184 lb 1 oz)    SpO2 95%    BMI 24.96 kg/m2       Patient is status post MAC anesthesia for Procedure(s): HEMORRHOIDECTOMY . Nausea/Vomiting: controlled    Postoperative hydration reviewed and adequate. Pain:  Pain Scale 1: Numeric (0 - 10) (03/02/18 0901)  Pain Intensity 1: 0 (03/02/18 0901)   Managed      Mental Status and Level of Consciousness: Alert and oriented     Pulmonary Status:   O2 Device: Room air (03/02/18 0907)   Adequate oxygenation and airway patent    Complications related to anesthesia: None    Post-anesthesia assessment completed.  No concerns    Signed By: Tanya Bangura MD     March 2, 2018

## 2018-03-02 NOTE — OP NOTES
COLON AND RECTAL SURGERY OPERATIVE NOTE            Procedure Date: 3/2/2018    Indications: Symptomatic hemorrhoid disease,     Pre-operative Diagnosis:  hemorrhoids  k64.8,hyperthrophied anal papilla  k62.89     Post-operative Diagnosis:  Symptomatic hemorrhoid disease, hyperthrophied anal papilla     Title of Procedure:  1. Complex 2 Quadrant External and Internal Hemorrhoidectomy               2. Excision of Hypertrophied Anal Papillae               3. Fulguration of Internal Hemorrhoid Disease (Right anterior quadrant)     Surgeon(s): Francisca Bowman MD    Assistants: Circ-1: Juanita Kemp RN  Scrub Tech-1: Maverick Rooney  Surg Asst-1: Jessica Hickey    Anesthesiologist: Anesthesiologist: Dao Vu MD  CRNA: Abbey Collet, CRNA; King Jt CRNA    Anesthesia: MAC    ASA Class: ASA 3 - Severe systemic disease but compensated       Indication for surgery:   The patient is a 71 y.o., WHITE OR  male who was recently seen in clinic and was noted to have symptomatic hyperthrophied anal papilla and hemorrhoid disease. Due to the severity of the disease process, surgical hemorrhoidectomy was discussed for more definitive management. The patient was informed of the indication for the procedure as well as the risk and complications. I discussed the details of the procedure as well as the risks of surgery including bleeding, infection, pain, anesthesia complications, possibility of recurrent disease, and the possibility of anal incontinence with any anal surgery. The patient demonstrated good understanding of surgical considerations, risks, benefits and alternatives and was willing to accept the risks of surgery. The patient elected to proceed with surgery, and therefore the surgery was scheduled.         Procedure    Findings:  Right anterior quadrant - Grade 2 internal hemorrhoid disease                    Right posterior quadrant -  Moderate internal and external hemorrhoid disease Left lateral quadrant -  Moderate internal and external hemorrhoid disease, Large (1.5 cm) hyperthrophied anal       papilla     Procedure Details: The patient was brought to the operating room and placed on the operating room table in the prone position after MAC anesthesia was successfully achieved by the Anesthesiology sergio. The safety strap was carefully secured and pressure points were carefully padded. The patient was then prepped and draped in the standard sterile fashion. The pneumatic compression boots in the lower extremity were placed prior to surgery. I next injected 20 mL of 1% lidocaine with epinephrine at the operative sites for satisfactory local anesthesia. With the aid of the Yu retractor, the anal canal was thoroughly evaluated. The patient had the symptomatic hemorrhoid disease and the hyperthrophied anal papilla  as listed in the Findings section above. No other abnormalities were noted. I proceeded with the surgical hemorrhoidectomy and excision of the hyperthrophied anal papilla in the following fashion for the left lateral quadrant disease. An inverted \"V\" incision was made sharply encompassing the diseased external hemorrhoidal tissues. This was excised sharply and with the aid of the electrocautery for hemostasis from the subcutaneous external sphincter muscle layer. In continuity, all of the internal hemorrhoidal tissues were excised from the internal sphincter muscle layer with the use of the Harmonic Focus. The hyperthrophied anal papilla was also excised en-bloc along with the hemorrhoid disease with the Harmonic Focus. Care was taken to avoid any injury to the sphincter muscle layers. The mucosal defect was then closed with a 3-0 Chromic suture in the running locked fashion. The anoderm and the perianal skin defects were closed with the same suture, but in a simple running fashion.  Next the hemorrhoid disease in the right posterior quadrant was surgically excised in similar fashion. The internal hemorrhoid disease in the right anterior quadrant was fully fulgurated with electrocautery. The anal canal was then irrigated with sterile saline, and hemostasis was noted to be complete. I next injected approximately 10 mL of 0.25% marcaine at the operative sites for post operative analgesia. A sterile dry dressing was next placed at the anal verge. The patient tolerated the procedure well and sent to the recovery room in good condition. Needle and sponge counts were correct.        Estimated Blood Loss:  10 mL           Drains: None           Total IV Fluids: 400 mL           Specimens: Sent - Left lateral hemorrhoid and hyperthrophied anal papilla to Pathology                  Complications: None             Disposition: aroused from sedation, and taken to the recovery room in a stable condition           Condition: good    Surgeons Signature:       Madi Monsivais MD, FACS, FASCRS  Colon and Rectal Surgery  [facility]  Office [phone]  Fax     [phone]  3/2/2018  10:28 AM

## 2018-03-02 NOTE — PERIOP NOTES
0901 received pt from OR fully A&OX3 no distress or complaints noted. Pt was coughing post procedure and MD was pt observered no need to to place on cardiac monitoring. BP WNL sats 95% on RA  0924 report given to [facility] in phase 2. No distress or complaints noted at this time. Pt transferred to phase 2.

## 2018-03-02 NOTE — ANESTHESIA PREPROCEDURE EVALUATION
Anesthetic History   No history of anesthetic complications            Review of Systems / Medical History  Patient summary reviewed and pertinent labs reviewed    Pulmonary    COPD: mild               Neuro/Psych       CVA  TIA and psychiatric history     Cardiovascular    Hypertension                   GI/Hepatic/Renal           Liver disease     Endo/Other        Arthritis     Other Findings   Comments:   Risk Factors for Postoperative nausea/vomiting:       History of postoperative nausea/vomiting? NO       Female? NO       Motion sickness? NO       Intended opioid administration for postoperative analgesia? NO      Smoking Abstinence  Current Smoker? NO  Elective Surgery? YES  Seen preoperatively by anesthesiologist or proxy prior to day of surgery? YES  Pt abstained from smoking 24 hours prior to anesthesia?  N/A           Physical Exam    Airway  Mallampati: III  TM Distance: 4 - 6 cm  Neck ROM: normal range of motion   Mouth opening: Normal     Cardiovascular    Rhythm: irregular  Rate: normal         Dental    Dentition: Poor dentition, Upper partial plate and Lower partial plate     Pulmonary                 Abdominal  GI exam deferred       Other Findings            Anesthetic Plan    ASA: 3  Anesthesia type: MAC            Anesthetic plan and risks discussed with: Patient

## 2018-03-02 NOTE — IP AVS SNAPSHOT
[address] [place] [address] Emily Fish  
[phone] Patient: Bren Yeager MRN: [id number] PSV:[id number] About your hospitalization You were admitted on:  March 2, 2018 You last received care in the:  [facility] PHASE 2 RECOVERY You were discharged on:  March 2, 2018 Why you were hospitalized Your primary diagnosis was:  Not on File Follow-up Information Follow up With Details Comments Contact Info Gilberto De Leon MD   [facility] [address] [place] [address] [address] 
[phone] Joao Bermudez MD In 2 weeks  [address] [address] 
[phone] Your Scheduled Appointments Wednesday March 14, 2018  1:00 PM EDT  
POST OP with Joao Bermudez MD  
[address] [facility] [address] [address]  
[phone] Friday March 23, 2018  2:15 PM EDT  
ESTABLISHED PATIENT with Leatha Yates MD  
[facility] [place] [address]  
[phone] Discharge Orders None A check stephanie indicates which time of day the medication should be taken. My Medications START taking these medications Instructions Each Dose to Equal  
 Morning Noon Evening Bedtime  
 oxyCODONE-acetaminophen 5-325 mg per tablet Commonly known as:  PERCOCET Your last dose was: Your next dose is: Take 1 Tab by mouth every four (4) hours as needed for Pain. Max Daily Amount: 6 Tabs. 1 Tab CONTINUE taking these medications Instructions Each Dose to Equal  
 Morning Noon Evening Bedtime  
 amLODIPine 10 mg tablet Commonly known as:  Rulon Romy Your last dose was: Your next dose is:    
   
   
 1 tab(s)  
     
   
   
   
  
 aspirin delayed-release 81 mg tablet Your last dose was: Your next dose is: Take  by mouth daily. CENTRUM ULTRA MEN'S PO Your last dose was: Your next dose is: Take  by mouth.  
     
   
   
   
  
 ciclopirox 0.77 % topical cream  
Commonly known as:  Sofi Bush Your last dose was: Your next dose is:    
   
   
 Apply  to affected area two (2) times a day. folic acid 656 mcg tablet Your last dose was: Your next dose is: Take 400 mcg by mouth daily. 400 mcg LEXAPRO 10 mg tablet Generic drug:  escitalopram oxalate Your last dose was: Your next dose is: Take 15 mg by mouth daily. 1.5 tabs 15 mg  
    
   
   
   
  
 LIPITOR 20 mg tablet Generic drug:  atorvastatin Your last dose was: Your next dose is: Take 20 mg by mouth daily. 20 mg  
    
   
   
   
  
 lisinopril 40 mg tablet Commonly known as:  Chandni Landry Your last dose was: Your next dose is:    
   
   
 1 tab(s) Where to Get Your Medications Information on where to get these meds will be given to you by the nurse or doctor. ! Ask your nurse or doctor about these medications  
  oxyCODONE-acetaminophen 5-325 mg per tablet Discharge Instructions [facility] [id number] [place] [address], [address] [place] [address] Anal and Rectal Surgery Discharge Instructions These instructions will cover the most common concerns following surgery on the anal area (including hemorrhoidectomy). If you have further questions or problems, please contact our office. The office is open Monday - Friday 8:30 AM to 4:30 pm. If emergencies arise after business hours, the answering service can contact the on-call physician. The number for the office and answering service is [phone]. Recovery from Anesthesia/Sedation · Do not engage in any activity that requires physical/mental coordination, as the medications may cause drowsiness and dizziness. · Do not drive or operate heavy machinery for 24 hours. · Do not consume alcohol, tranquilizers or sleeping medications for 24 hours. · You must have someone home with you today. Activity · You are advised to go directly home. Restrict your activities and rest for a day. · Resume light to normal activity tomorrow unless instructed differently. · Try to avoid sitting for prolonged periods with pressure on the surgical site. Reclining or sitting to one side is better. It may take several weeks to return to normal activity. Fluids and Diet · Begin with a light diet (soup, toast, crackers). · Unless instructed differently, you may advance to regular food. · Avoid heavy, greasy and spicy foods. · Drink plenty of fluids daily. Follow-Up Unless you already have an appointment, call the office ([phone]) when you get home to make an appointment to see your surgeon approximately 2 weeks after discharge from the hospital. Also call the office for: · Fever greater than 101.5 F 
· Inability to urinate for more than 8 hours · Warmth, redness, or foul-smelling drainage from around the incision · Sudden loss of bright red blood from surgical site or rectum (greater than 1/2 cup). Pain Discomfort is common after surgery in this area. Soaking in a Sitz bath or tub of warn water may help with pain. · A narcotic pain medication has been prescribed by your surgeon. Take as directed. · Use your over-the-counter pain medication such as Ibuprofen when you have finished the prescription provided by your surgeon or if you feel that the pain can be managed with these medications. Bowel Regimen Many people find it helps to take fiber supplementation and a stool softener to regulate the bowels after surgery, especially if narcotic pain medication is being used. Colace or Miralax are options. You can take Milk of Magnesia one to two tablespoons every 4-6 hours as needed for more severe constipation. Wound Care It is common to have some clear or light yellow or pink-tinged drainage from the incision. Thick, foul-smelling drainage can be a sign of infection. Call the office if this occurs. It is also common to have small amounts of bleeding with bowel movements. Keep the surgical area clean by washing in the shower, tub or sitz bath after bowel movements. Gauze pads or a menstrual pad cab be used to protect clothing from drainage. It is fine to wash the wound in the shower. Avoid putting soap directly into the incision. · Remove packing and/or dressing  from incision in 24 hours. · It is important to take tub or sitz baths at least 4 times daily soaking at least 15 minutes each time. Try to take these baths especially after bowel movements. · Keep wound or incision covered with antibiotic ointment and dry gauze after each baths. Please contact our office for any concerns or questions. Torito Garcia MD, FACS, [facility] Colon and Rectal Surgery [facility] Office [phone] Fax     [phone] These instructions have been reviewed with me. I understand the above instructions and have no further questions. Responsible Party Signature: ______________________________________ Date: March 2, 2018 Nurse's Signature: ______________________________________ Date: March 2, 2018 DISCHARGE SUMMARY from Nurse PATIENT INSTRUCTIONS: 
 
After general anesthesia or intravenous sedation, for 24 hours or while taking prescription Narcotics: · Limit your activities · Do not drive and operate hazardous machinery · Do not make important personal or business decisions · Do  not drink alcoholic beverages · If you have not urinated within 8 hours after discharge, please contact your surgeon on call. Report the following to your surgeon: 
· Excessive pain, swelling, redness or odor of or around the surgical area · Temperature over 100.5 · Nausea and vomiting lasting longer than 4 hours or if unable to take medications · Any signs of decreased circulation or nerve impairment to extremity: change in color, persistent  numbness, tingling, coldness or increase pain · Any questions What to do at Home: These are general instructions for a healthy lifestyle: No smoking/ No tobacco products/ Avoid exposure to second hand smoke Surgeon General's Warning:  Quitting smoking now greatly reduces serious risk to your health. Obesity, smoking, and sedentary lifestyle greatly increases your risk for illness A healthy diet, regular physical exercise & weight monitoring are important for maintaining a healthy lifestyle You may be retaining fluid if you have a history of heart failure or if you experience any of the following symptoms:  Weight gain of 3 pounds or more overnight or 5 pounds in a week, increased swelling in our hands or feet or shortness of breath while lying flat in bed. Please call your doctor as soon as you notice any of these symptoms; do not wait until your next office visit. Recognize signs and symptoms of STROKE: 
 
F-face looks uneven A-arms unable to move or move unevenly S-speech slurred or non-existent T-time-call 911 as soon as signs and symptoms begin-DO NOT go Back to bed or wait to see if you get better-TIME IS BRAIN. Warning Signs of HEART ATTACK Call 911 if you have these symptoms: 
? Chest discomfort. Most heart attacks involve discomfort in the center of the chest that lasts more than a few minutes, or that goes away and comes back. It can feel like uncomfortable pressure, squeezing, fullness, or pain. ? Discomfort in other areas of the upper body.  Symptoms can include pain or discomfort in one or both arms, the back, neck, jaw, or stomach. ? Shortness of breath with or without chest discomfort. ? Other signs may include breaking out in a cold sweat, nausea, or lightheadedness. Don't wait more than five minutes to call [address]! Fast action can save your life. Calling 911 is almost always the fastest way to get lifesaving treatment. Emergency Medical Services staff can begin treatment when they arrive  up to an hour sooner than if someone gets to the hospital by car. The discharge information has been reviewed with the patient. The patient verbalized understanding. Discharge medications reviewed with the patient and appropriate educational materials and side effects teaching were provided. ___________________________________________________________________________________________________________________________________ Patient armband removed and given to patient to take home. Patient was informed of the privacy risks if armband lost or stolen Introducing [facility]! [facility] introduces [employer] patient portal. Now you can access parts of your medical record, email your doctor's office, and request medication refills online. 1. In your internet browser, go to [email or web address] 2. Click on the First Time User? Click Here link in the Sign In box. You will see the New Member Sign Up page. 3. Enter your [employer] Access Code exactly as it appears below. You will not need to use this code after youve completed the sign-up process. If you do not sign up before the expiration date, you must request a new code. · [employer] Access Code: [email or web address] Expires: 3/6/2018 11:05 AM 
 
4. Enter the last four digits of your Social Security Number (xxxx) and Date of Birth (mm/dd/yyyy) as indicated and click Submit. You will be taken to the next sign-up page. 5. Create a [employer] ID. This will be your [employer] login ID and cannot be changed, so think of one that is secure and easy to remember. 6. Create a [employer] password. You can change your password at any time. 7. Enter your Password Reset Question and Answer. This can be used at a later time if you forget your password. 8. Enter your e-mail address. You will receive e-mail notification when new information is available in [address] 9. Click Sign Up. You can now view and download portions of your medical record. 10. Click the Download Summary menu link to download a portable copy of your medical information. If you have questions, please visit the Frequently Asked Questions section of the [employer] website. Remember, [employer] is NOT to be used for urgent needs. For medical emergencies, dial 911. Now available from your iPhone and Android! Providers Seen During Your Hospitalization Provider Specialty Primary office phone Louis Devi MD Colon and Rectal Surgery [phone] Your Primary Care Physician (PCP) Primary Care Physician Office Phone Office Fax [facility] Erika Abdalla 76 [phone] You are allergic to the following No active allergies Recent Documentation Height Weight BMI Smoking Status 1.829 m 83.5 kg 24.96 kg/m2 Former Smoker Emergency Contacts Name Discharge Info Relation Home Work Mobile Drea Fan DISCHARGE CAREGIVER [3] Daughter [21] [phone] Drea Fan  Child [2] [phone] Patient Belongings The following personal items are in your possession at time of discharge: 
  Dental Appliances: None  Visual Aid: Glasses      Home Medications: None   Jewelry: None  Clothing: Shirt, Socks, Undergarments, Pants, Jacket/Coat, Footwear    Other Valuables: None Please provide this summary of care documentation to your next provider. Signatures-by signing, you are acknowledging that this After Visit Summary has been reviewed with you and you have received a copy. Patient Signature:  ____________________________________________________________ Date:  ____________________________________________________________  
  
Zack Can Provider Signature:  ____________________________________________________________ Date:  ____________________________________________________________

## 2018-03-02 NOTE — DISCHARGE INSTRUCTIONS
[facility]  [address] [address], [address] [place] [address]            Anal and Rectal Surgery Discharge Instructions    These instructions will cover the most common concerns following surgery on the anal area (including hemorrhoidectomy). If you have further questions or problems, please contact our office. The office is open Monday - Friday 8:30 AM to 4:30 pm. If emergencies arise after business hours, the answering service can contact the on-call physician. The number for the office and answering service is [phone]. Recovery from Anesthesia/Sedation  · Do not engage in any activity that requires physical/mental coordination, as the medications may cause drowsiness and dizziness. · Do not drive or operate heavy machinery for 24 hours. · Do not consume alcohol, tranquilizers or sleeping medications for 24 hours. · You must have someone home with you today. Activity  · You are advised to go directly home. Restrict your activities and rest for a day. · Resume light to normal activity tomorrow unless instructed differently. · Try to avoid sitting for prolonged periods with pressure on the surgical site. Reclining or sitting to one side is better. It may take several weeks to return to normal activity. Fluids and Diet  · Begin with a light diet (soup, toast, crackers). · Unless instructed differently, you may advance to regular food. · Avoid heavy, greasy and spicy foods. · Drink plenty of fluids daily.     Follow-Up  Unless you already have an appointment, call the office ([phone]) when you get home to make an appointment to see your surgeon approximately 2 weeks after discharge from the hospital. Also call the office for:  · Fever greater than 101.5 F  · Inability to urinate for more than 8 hours  · Warmth, redness, or foul-smelling drainage from around the incision  · Sudden loss of bright red blood from surgical site or rectum (greater than 1/2 cup).    Pain  Discomfort is common after surgery in this area. Soaking in a Sitz bath or tub of warn water may help with pain. · A narcotic pain medication has been prescribed by your surgeon. Take as directed. · Use your over-the-counter pain medication such as Ibuprofen when you have finished the prescription provided by your surgeon or if you feel that the pain can be managed with these medications. Bowel Regimen  Many people find it helps to take fiber supplementation and a stool softener to regulate the bowels after surgery, especially if narcotic pain medication is being used. Colace or Miralax are options. You can take Milk of Magnesia one to two tablespoons every 4-6 hours as needed for more severe constipation. Wound Care  It is common to have some clear or light yellow or pink-tinged drainage from the incision. Thick, foul-smelling drainage can be a sign of infection. Call the office if this occurs. It is also common to have small amounts of bleeding with bowel movements. Keep the surgical area clean by washing in the shower, tub or sitz bath after bowel movements. Gauze pads or a menstrual pad cab be used to protect clothing from drainage. It is fine to wash the wound in the shower. Avoid putting soap directly into the incision. · Remove packing and/or dressing  from incision in 24 hours. · It is important to take tub or sitz baths at least 4 times daily soaking at least 15 minutes each time. Try to take these baths especially after bowel movements. · Keep wound or incision covered with antibiotic ointment and dry gauze after each baths. Please contact our office for any concerns or questions. Melanie Pfeiffer MD, FACS, FASCRS  Colon and Rectal Surgery  [facility]  Office [phone]  Fax     [phone]      These instructions have been reviewed with me. I understand the above instructions and have no further questions.     Responsible Party Signature: ______________________________________    Date: March 2, 2018    Nurse's Signature: ______________________________________    Date: March 2, 2018         DISCHARGE SUMMARY from Nurse    PATIENT INSTRUCTIONS:    After general anesthesia or intravenous sedation, for 24 hours or while taking prescription Narcotics:  · Limit your activities  · Do not drive and operate hazardous machinery  · Do not make important personal or business decisions  · Do  not drink alcoholic beverages  · If you have not urinated within 8 hours after discharge, please contact your surgeon on call. Report the following to your surgeon:  · Excessive pain, swelling, redness or odor of or around the surgical area  · Temperature over 100.5  · Nausea and vomiting lasting longer than 4 hours or if unable to take medications  · Any signs of decreased circulation or nerve impairment to extremity: change in color, persistent  numbness, tingling, coldness or increase pain  · Any questions    What to do at Home:  These are general instructions for a healthy lifestyle:    No smoking/ No tobacco products/ Avoid exposure to second hand smoke  Surgeon General's Warning:  Quitting smoking now greatly reduces serious risk to your health. Obesity, smoking, and sedentary lifestyle greatly increases your risk for illness    A healthy diet, regular physical exercise & weight monitoring are important for maintaining a healthy lifestyle    You may be retaining fluid if you have a history of heart failure or if you experience any of the following symptoms:  Weight gain of 3 pounds or more overnight or 5 pounds in a week, increased swelling in our hands or feet or shortness of breath while lying flat in bed. Please call your doctor as soon as you notice any of these symptoms; do not wait until your next office visit.     Recognize signs and symptoms of STROKE:    F-face looks uneven    A-arms unable to move or move unevenly    S-speech slurred or non-existent    T-time-call 911 as soon as signs and symptoms begin-DO NOT go       Back to bed or wait to see if you get better-TIME IS BRAIN. Warning Signs of HEART ATTACK     Call 911 if you have these symptoms:   Chest discomfort. Most heart attacks involve discomfort in the center of the chest that lasts more than a few minutes, or that goes away and comes back. It can feel like uncomfortable pressure, squeezing, fullness, or pain.  Discomfort in other areas of the upper body. Symptoms can include pain or discomfort in one or both arms, the back, neck, jaw, or stomach.  Shortness of breath with or without chest discomfort.  Other signs may include breaking out in a cold sweat, nausea, or lightheadedness. Don't wait more than five minutes to call 911 - MINUTES MATTER! Fast action can save your life. Calling 911 is almost always the fastest way to get lifesaving treatment. Emergency Medical Services staff can begin treatment when they arrive -- up to an hour sooner than if someone gets to the hospital by car. The discharge information has been reviewed with the patient. The patient verbalized understanding. Discharge medications reviewed with the patient and appropriate educational materials and side effects teaching were provided. ___________________________________________________________________________________________________________________________________  Patient armband removed and given to patient to take home.   Patient was informed of the privacy risks if armband lost or stolen

## 2018-03-02 NOTE — PROGRESS NOTES
conducted a pre-surgery visit with Lisette Barbosa, who is a 71 y.o.,male. The  provided the following Interventions:  Initiated a relationship of care and support. Offered prayer and assurance of continued prayers on patient's behalf. Plan:  Chaplains will continue to follow and will provide pastoral care on an as needed/requested basis.  recommends bedside caregivers page  on duty if patient shows signs of acute spiritual or emotional distress.     [facility]   Staff [address] [facility]   [phone]

## 2018-03-14 ENCOUNTER — OFFICE VISIT (OUTPATIENT)
Dept: SURGERY | Age: 73
End: 2018-03-14

## 2018-03-14 VITALS
TEMPERATURE: 97 F | HEART RATE: 72 BPM | HEIGHT: 72 IN | OXYGEN SATURATION: 95 % | DIASTOLIC BLOOD PRESSURE: 72 MMHG | BODY MASS INDEX: 25.19 KG/M2 | SYSTOLIC BLOOD PRESSURE: 117 MMHG | RESPIRATION RATE: 16 BRPM | WEIGHT: 186 LBS

## 2018-03-14 DIAGNOSIS — Z09 POSTOPERATIVE EXAMINATION: Primary | ICD-10-CM

## 2018-03-14 NOTE — MR AVS SNAPSHOT
[address] [address] 
[phone] Patient: Yanci Bose MRN: [id number] YGT:[id number]/[id number] Visit Information Date & Time Provider Department Dept. Phone Encounter #  
 3/14/2018  1:00 PM SUKI Nieves [facility] [phone] [id number] Your Appointments 3/23/2018  2:15 PM  
ESTABLISHED PATIENT with Amparo Ann MD  
[facility] Carol Sharps) Appt Note: 3 week f/u to rv scan and Deg 240 mg injection [address], [place] [address] 2201 [facility] [address]  
  
   
 [address], [address] [place] [address] Upcoming Health Maintenance Date Due Hepatitis C Screening 1945 DTaP/Tdap/Td series (1 - Tdap) 9/3/1967 FOBT Q 1 YEAR AGE 50-75 9/3/1996 ZOSTER VACCINE AGE 60> 7/3/2006 GLAUCOMA SCREENING Q2Y 9/3/2011 Pneumococcal 65+ High/Highest Risk (1 of 2 - PCV13) 9/3/2011 MEDICARE YEARLY EXAM 9/3/2011 Allergies as of 3/14/2018  Review Complete On: 3/2/2018 By: Nidia Walters No Known Allergies Current Immunizations  Never Reviewed No immunizations on file. Not reviewed this visit Vitals BP Pulse Temp Resp Height(growth percentile) Weight(growth percentile) 117/72 (BP 1 Location: Right arm, BP Patient Position: Sitting) 72 97 °F (36.1 °C) (Oral) 16 6' (1.829 m) 186 lb (84.4 kg) SpO2 BMI Smoking Status 95% 25.23 kg/m2 Former Smoker BMI and BSA Data Body Mass Index Body Surface Area  
 25.23 kg/m 2 2.07 m 2 Preferred Pharmacy Pharmacy Name Phone [facility], [address] [place] [address] [phone] Your Updated Medication List  
  
   
This list is accurate as of 3/14/18  1:51 PM.  Always use your most recent med list. amLODIPine 10 mg tablet Commonly known as:  NORVASC  
1 tab(s)  
  
 aspirin delayed-release 81 mg tablet Take  by mouth daily. CENTRUM ULTRA MEN'S PO Take  by mouth.  
  
 ciclopirox 0.77 % topical cream  
Commonly known as:  Donel Sages Apply  to affected area two (2) times a day. folic acid 044 mcg tablet Take 400 mcg by mouth daily. LEXAPRO 10 mg tablet Generic drug:  escitalopram oxalate Take 15 mg by mouth daily. 1.5 tabs LIPITOR 20 mg tablet Generic drug:  atorvastatin Take 20 mg by mouth daily. lisinopril 40 mg tablet Commonly known as:  PRINIVIL, ZESTRIL  
1 tab(s)  
  
 oxyCODONE-acetaminophen 5-325 mg per tablet Commonly known as:  PERCOCET Take 1 Tab by mouth every four (4) hours as needed for Pain. Max Daily Amount: 6 Tabs. Patient Instructions If you have any questions or concerns about today's appointment, the verbal and/or written instructions you were given for follow up care, please call our office at [phone]. [facility] [address] 
 
[phone] office [phone] Introducing [facility]! [facility] introduces [employer] patient portal. Now you can access parts of your medical record, email your doctor's office, and request medication refills online. 1. In your internet browser, go to [email or web address] 2. Click on the First Time User? Click Here link in the Sign In box. You will see the New Member Sign Up page. 3. Enter your [employer] Access Code exactly as it appears below. You will not need to use this code after youve completed the sign-up process. If you do not sign up before the expiration date, you must request a new code. · [employer] Access Code: [email or web address] Expires: 6/12/2018  1:07 PM 
 
4.  Enter the last four digits of your Social Security Number (xxxx) and Date of Birth (mm/dd/yyyy) as indicated and click Submit. You will be taken to the next sign-up page. 5. Create a HelloSign ID. This will be your [employer] login ID and cannot be changed, so think of one that is secure and easy to remember. 6. Create a [employer] password. You can change your password at any time. 7. Enter your Password Reset Question and Answer. This can be used at a later time if you forget your password. 8. Enter your e-mail address. You will receive e-mail notification when new information is available in [address] 9. Click Sign Up. You can now view and download portions of your medical record. 10. Click the Download Summary menu link to download a portable copy of your medical information. If you have questions, please visit the Frequently Asked Questions section of the [employer] website. Remember, [employer] is NOT to be used for urgent needs. For medical emergencies, dial 911. Now available from your iPhone and Android! Please provide this summary of care documentation to your next provider. Your primary care clinician is listed as Rebecca Soares. If you have any questions after today's visit, please call [phone].

## 2018-03-14 NOTE — PROGRESS NOTES
1. Have you been to the ER, urgent care clinic since your last visit? Hospitalized since your last visit? No    2. Have you seen or consulted any other health care providers outside of the [address] [place] [address] since your last visit? Include any pap smears or colon screening. No     Patient presents today for Post op visit from Hemorrhoidectomy on 3/2/2018.

## 2018-03-14 NOTE — PATIENT INSTRUCTIONS
If you have any questions or concerns about today's appointment, the verbal and/or written instructions you were given for follow up care, please call our office at [phone].     [facility] - [address]    [phone] office  [phone]

## 2018-03-14 NOTE — PROGRESS NOTES
[facility]  [address], [address] [facility], [address]  [phone]             Patient: Sruthi Armendariz  Admitted: (Not on file) MRN: [id number]     Age:  71 y.o.,      Sex: male    YOB: 1945       Subjective  Mr. Huber Schultz is an 71 y.o. male who is status post hemorrhoidectomy. He   is currently without complaints except mild bleeding . His bowels are regular. denies fever. Past Medical History:   Diagnosis Date    AAA (abdominal aortic aneurysm) without rupture (HCC) 12/2017    measues 4.1    BPH with obstruction/lower urinary tract symptoms     Cerebral artery occlusion with cerebral infarction (HCC)     x2    Cirrhosis of liver (HCC)     Elevated PSA     Emphysema lung (HCC)     Emphysema of lung (HCC)     Hematuria, gross     History of ETOH abuse     History of UTI     Hypercholesterolemia     Hypertension     Memory impairment     Osteoarthritis     Prostate CA ([facility] 02/2018    Stopped smoking with greater than 40 pack year history        Review of Systems  ROS completed and documented by nursing, reviewed with patient. Objective    Vitals:    03/14/18 1316   BP: 117/72   Pulse: 72   Resp: 16   Temp: 97 °F (36.1 °C)   TempSrc: Oral   SpO2: 95%   Weight: 84.4 kg (186 lb)   Height: 6' (1.829 m)   PainSc:   0 - No pain       Physical Exam:  General: alert, cooperative, no distress, appears stated age  Abdomen: soft, non-tender, bowels sounds active  Anorectal:  With the patient in the prone position the anus appeared within normal limits. No images are attached to the encounter. Assessment / Plan    Mr. Huber Schultz is Doing well postoperatively. Encounter Diagnoses   Name Primary?     Postoperative examination Yes         Sitz baths, Avoid straining, Increase fluid intake and Increase activity  Miralax 17 grams in liquid every morning or every other morning  Follow up with Dr. Marylin Lala in one month    Sonia Freeman, BONITAP-BC  Colon and Rectal Surgery  [facility]  Office [phone]  Fax     [phone]  3/14/2018  2:54 PM

## 2018-08-14 ENCOUNTER — HOSPITAL ENCOUNTER (OUTPATIENT)
Dept: RADIATION THERAPY | Age: 73
Discharge: HOME | End: 2018-08-14
Payer: MEDICARE

## 2018-08-14 PROCEDURE — 99211 OFF/OP EST MAY X REQ PHY/QHP: CPT

## 2018-09-20 ENCOUNTER — HOSPITAL ENCOUNTER (OUTPATIENT)
Dept: RADIATION THERAPY | Age: 73
Discharge: HOME | End: 2018-09-20
Payer: MEDICARE

## 2018-09-20 PROCEDURE — 77030018846 HC SOL IRR STRL H20 ICUM -A

## 2018-09-20 PROCEDURE — 74011250636 HC RX REV CODE- 250/636: Performed by: RADIOLOGY

## 2018-09-20 PROCEDURE — A4648 IMPLANTABLE TISSUE MARKER: HCPCS

## 2018-09-20 PROCEDURE — 77030015736 HC BLLN ENDOCAV CIVC -B

## 2018-09-20 RX ORDER — LIDOCAINE HYDROCHLORIDE 10 MG/ML
30 INJECTION INFILTRATION; PERINEURAL ONCE
Status: COMPLETED | OUTPATIENT
Start: 2018-09-20 | End: 2018-09-20

## 2018-09-20 RX ADMIN — LIDOCAINE HYDROCHLORIDE 30 ML: 10 INJECTION, SOLUTION INFILTRATION; PERINEURAL at 10:36

## 2018-09-25 ENCOUNTER — HOSPITAL ENCOUNTER (OUTPATIENT)
Dept: RADIATION THERAPY | Age: 73
Discharge: HOME | End: 2018-09-25
Payer: MEDICARE

## 2018-09-26 ENCOUNTER — HOSPITAL ENCOUNTER (OUTPATIENT)
Dept: RADIATION THERAPY | Age: 73
Discharge: HOME | End: 2018-09-26
Payer: MEDICARE

## 2018-09-27 ENCOUNTER — HOSPITAL ENCOUNTER (OUTPATIENT)
Dept: RADIATION THERAPY | Age: 73
Discharge: HOME | End: 2018-09-27
Payer: MEDICARE

## 2018-09-27 LAB — CREAT UR-MCNC: 1.3 MG/DL (ref 0.6–1.3)

## 2018-09-27 PROCEDURE — 82565 ASSAY OF CREATININE: CPT

## 2018-09-27 PROCEDURE — 77334 RADIATION TREATMENT AID(S): CPT

## 2018-09-28 ENCOUNTER — HOSPITAL ENCOUNTER (OUTPATIENT)
Dept: RADIATION THERAPY | Age: 73
Discharge: HOME | End: 2018-09-28
Payer: MEDICARE

## 2018-10-01 ENCOUNTER — HOSPITAL ENCOUNTER (OUTPATIENT)
Dept: RADIATION THERAPY | Age: 73
Discharge: HOME | End: 2018-10-01
Payer: MEDICARE

## 2018-10-01 PROCEDURE — 77301 RADIOTHERAPY DOSE PLAN IMRT: CPT

## 2018-10-01 PROCEDURE — 77300 RADIATION THERAPY DOSE PLAN: CPT

## 2018-10-01 PROCEDURE — 77338 DESIGN MLC DEVICE FOR IMRT: CPT

## 2018-10-02 ENCOUNTER — HOSPITAL ENCOUNTER (OUTPATIENT)
Dept: RADIATION THERAPY | Age: 73
Discharge: HOME | End: 2018-10-02
Payer: MEDICARE

## 2018-10-02 PROBLEM — C61 PROSTATE CANCER (HCC): Status: ACTIVE | Noted: 2018-10-02

## 2018-10-03 ENCOUNTER — HOSPITAL ENCOUNTER (OUTPATIENT)
Dept: RADIATION THERAPY | Age: 73
Discharge: HOME | End: 2018-10-03
Payer: MEDICARE

## 2018-10-04 ENCOUNTER — HOSPITAL ENCOUNTER (OUTPATIENT)
Dept: RADIATION THERAPY | Age: 73
Discharge: HOME | End: 2018-10-04
Payer: MEDICARE

## 2018-10-04 PROCEDURE — 77385 HC IMRT TRMT DLVR SMPL: CPT

## 2018-10-05 ENCOUNTER — HOSPITAL ENCOUNTER (OUTPATIENT)
Dept: RADIATION THERAPY | Age: 73
Discharge: HOME | End: 2018-10-05
Payer: MEDICARE

## 2018-10-05 PROCEDURE — 77385 HC IMRT TRMT DLVR SMPL: CPT

## 2018-10-08 ENCOUNTER — HOSPITAL ENCOUNTER (OUTPATIENT)
Dept: RADIATION THERAPY | Age: 73
Discharge: HOME | End: 2018-10-08
Payer: MEDICARE

## 2018-10-08 PROCEDURE — 77385 HC IMRT TRMT DLVR SMPL: CPT

## 2018-10-09 ENCOUNTER — HOSPITAL ENCOUNTER (OUTPATIENT)
Dept: RADIATION THERAPY | Age: 73
Discharge: HOME | End: 2018-10-09
Payer: MEDICARE

## 2018-10-09 PROCEDURE — 77385 HC IMRT TRMT DLVR SMPL: CPT

## 2018-10-10 ENCOUNTER — HOSPITAL ENCOUNTER (OUTPATIENT)
Dept: RADIATION THERAPY | Age: 73
Discharge: HOME | End: 2018-10-10
Payer: MEDICARE

## 2018-10-10 PROCEDURE — 77385 HC IMRT TRMT DLVR SMPL: CPT

## 2018-10-10 PROCEDURE — 77336 RADIATION PHYSICS CONSULT: CPT

## 2018-10-11 ENCOUNTER — HOSPITAL ENCOUNTER (OUTPATIENT)
Dept: RADIATION THERAPY | Age: 73
Discharge: HOME | End: 2018-10-11
Payer: MEDICARE

## 2018-10-11 PROCEDURE — 77385 HC IMRT TRMT DLVR SMPL: CPT

## 2018-10-11 PROCEDURE — 77338 DESIGN MLC DEVICE FOR IMRT: CPT

## 2018-10-11 PROCEDURE — 77300 RADIATION THERAPY DOSE PLAN: CPT

## 2018-10-12 ENCOUNTER — HOSPITAL ENCOUNTER (OUTPATIENT)
Dept: RADIATION THERAPY | Age: 73
Discharge: HOME | End: 2018-10-12
Payer: MEDICARE

## 2018-10-12 PROCEDURE — 77385 HC IMRT TRMT DLVR SMPL: CPT

## 2018-10-15 ENCOUNTER — HOSPITAL ENCOUNTER (OUTPATIENT)
Dept: RADIATION THERAPY | Age: 73
Discharge: HOME | End: 2018-10-15
Payer: MEDICARE

## 2018-10-15 PROCEDURE — 77385 HC IMRT TRMT DLVR SMPL: CPT

## 2018-10-16 ENCOUNTER — HOSPITAL ENCOUNTER (OUTPATIENT)
Dept: RADIATION THERAPY | Age: 73
Discharge: HOME | End: 2018-10-16
Payer: MEDICARE

## 2018-10-16 PROCEDURE — 77300 RADIATION THERAPY DOSE PLAN: CPT

## 2018-10-16 PROCEDURE — 77338 DESIGN MLC DEVICE FOR IMRT: CPT

## 2018-10-17 ENCOUNTER — HOSPITAL ENCOUNTER (OUTPATIENT)
Dept: RADIATION THERAPY | Age: 73
Discharge: HOME | End: 2018-10-17
Payer: MEDICARE

## 2018-10-17 PROCEDURE — 77385 HC IMRT TRMT DLVR SMPL: CPT

## 2018-10-18 ENCOUNTER — HOSPITAL ENCOUNTER (OUTPATIENT)
Dept: RADIATION THERAPY | Age: 73
Discharge: HOME | End: 2018-10-18
Payer: MEDICARE

## 2018-10-18 PROCEDURE — 77385 HC IMRT TRMT DLVR SMPL: CPT

## 2018-10-18 PROCEDURE — 77336 RADIATION PHYSICS CONSULT: CPT

## 2018-10-19 ENCOUNTER — HOSPITAL ENCOUNTER (OUTPATIENT)
Dept: RADIATION THERAPY | Age: 73
Discharge: HOME | End: 2018-10-19
Payer: MEDICARE

## 2018-10-19 PROCEDURE — 77385 HC IMRT TRMT DLVR SMPL: CPT

## 2018-10-22 ENCOUNTER — HOSPITAL ENCOUNTER (OUTPATIENT)
Dept: RADIATION THERAPY | Age: 73
Discharge: HOME | End: 2018-10-22
Payer: MEDICARE

## 2018-10-22 PROCEDURE — 77385 HC IMRT TRMT DLVR SMPL: CPT

## 2018-10-23 ENCOUNTER — HOSPITAL ENCOUNTER (OUTPATIENT)
Dept: RADIATION THERAPY | Age: 73
Discharge: HOME | End: 2018-10-23
Payer: MEDICARE

## 2018-10-23 PROCEDURE — 77385 HC IMRT TRMT DLVR SMPL: CPT

## 2018-10-24 ENCOUNTER — HOSPITAL ENCOUNTER (OUTPATIENT)
Dept: RADIATION THERAPY | Age: 73
Discharge: HOME | End: 2018-10-24
Payer: MEDICARE

## 2018-10-24 PROCEDURE — 77385 HC IMRT TRMT DLVR SMPL: CPT

## 2018-10-25 ENCOUNTER — HOSPITAL ENCOUNTER (OUTPATIENT)
Dept: RADIATION THERAPY | Age: 73
Discharge: HOME | End: 2018-10-25
Payer: MEDICARE

## 2018-10-25 PROCEDURE — 77336 RADIATION PHYSICS CONSULT: CPT

## 2018-10-25 PROCEDURE — 77385 HC IMRT TRMT DLVR SMPL: CPT

## 2018-10-26 ENCOUNTER — HOSPITAL ENCOUNTER (OUTPATIENT)
Dept: RADIATION THERAPY | Age: 73
Discharge: HOME | End: 2018-10-26
Payer: MEDICARE

## 2018-10-26 PROCEDURE — 77385 HC IMRT TRMT DLVR SMPL: CPT

## 2018-10-29 ENCOUNTER — HOSPITAL ENCOUNTER (OUTPATIENT)
Dept: RADIATION THERAPY | Age: 73
Discharge: HOME | End: 2018-10-29
Payer: MEDICARE

## 2018-10-29 PROCEDURE — 77385 HC IMRT TRMT DLVR SMPL: CPT

## 2018-10-30 ENCOUNTER — HOSPITAL ENCOUNTER (OUTPATIENT)
Dept: RADIATION THERAPY | Age: 73
Discharge: HOME | End: 2018-10-30
Payer: MEDICARE

## 2018-10-30 PROCEDURE — 77385 HC IMRT TRMT DLVR SMPL: CPT

## 2018-10-31 ENCOUNTER — HOSPITAL ENCOUNTER (OUTPATIENT)
Dept: RADIATION THERAPY | Age: 73
Discharge: HOME | End: 2018-10-31
Payer: MEDICARE

## 2018-10-31 PROCEDURE — 77385 HC IMRT TRMT DLVR SMPL: CPT

## 2018-11-01 ENCOUNTER — HOSPITAL ENCOUNTER (OUTPATIENT)
Dept: RADIATION THERAPY | Age: 73
Discharge: HOME | End: 2018-11-01
Payer: MEDICARE

## 2018-11-01 PROCEDURE — 77336 RADIATION PHYSICS CONSULT: CPT

## 2018-11-01 PROCEDURE — 77385 HC IMRT TRMT DLVR SMPL: CPT

## 2018-11-02 ENCOUNTER — HOSPITAL ENCOUNTER (OUTPATIENT)
Dept: RADIATION THERAPY | Age: 73
Discharge: HOME | End: 2018-11-02
Payer: MEDICARE

## 2018-11-02 PROCEDURE — 77385 HC IMRT TRMT DLVR SMPL: CPT

## 2018-11-05 ENCOUNTER — HOSPITAL ENCOUNTER (OUTPATIENT)
Dept: RADIATION THERAPY | Age: 73
Discharge: HOME | End: 2018-11-05
Payer: MEDICARE

## 2018-11-05 PROCEDURE — 77385 HC IMRT TRMT DLVR SMPL: CPT

## 2018-11-06 ENCOUNTER — HOSPITAL ENCOUNTER (OUTPATIENT)
Dept: RADIATION THERAPY | Age: 73
Discharge: HOME | End: 2018-11-06
Payer: MEDICARE

## 2018-11-06 PROCEDURE — 77385 HC IMRT TRMT DLVR SMPL: CPT

## 2018-11-07 ENCOUNTER — HOSPITAL ENCOUNTER (OUTPATIENT)
Dept: RADIATION THERAPY | Age: 73
Discharge: HOME | End: 2018-11-07
Payer: MEDICARE

## 2018-11-07 PROCEDURE — 77385 HC IMRT TRMT DLVR SMPL: CPT

## 2018-11-08 ENCOUNTER — HOSPITAL ENCOUNTER (OUTPATIENT)
Dept: RADIATION THERAPY | Age: 73
Discharge: HOME | End: 2018-11-08
Payer: MEDICARE

## 2018-11-08 PROCEDURE — 77336 RADIATION PHYSICS CONSULT: CPT

## 2018-11-08 PROCEDURE — 77385 HC IMRT TRMT DLVR SMPL: CPT

## 2018-11-09 ENCOUNTER — HOSPITAL ENCOUNTER (OUTPATIENT)
Dept: RADIATION THERAPY | Age: 73
Discharge: HOME | End: 2018-11-09
Payer: MEDICARE

## 2018-11-09 PROCEDURE — 77385 HC IMRT TRMT DLVR SMPL: CPT

## 2018-11-12 ENCOUNTER — HOSPITAL ENCOUNTER (OUTPATIENT)
Dept: RADIATION THERAPY | Age: 73
Discharge: HOME | End: 2018-11-12
Payer: MEDICARE

## 2018-11-12 PROCEDURE — 77385 HC IMRT TRMT DLVR SMPL: CPT

## 2018-11-13 ENCOUNTER — HOSPITAL ENCOUNTER (OUTPATIENT)
Dept: RADIATION THERAPY | Age: 73
Discharge: HOME | End: 2018-11-13
Payer: MEDICARE

## 2018-11-13 PROCEDURE — 77385 HC IMRT TRMT DLVR SMPL: CPT

## 2018-11-14 ENCOUNTER — HOSPITAL ENCOUNTER (OUTPATIENT)
Dept: RADIATION THERAPY | Age: 73
Discharge: HOME | End: 2018-11-14
Payer: MEDICARE

## 2018-11-14 PROCEDURE — 77385 HC IMRT TRMT DLVR SMPL: CPT

## 2018-11-15 ENCOUNTER — HOSPITAL ENCOUNTER (OUTPATIENT)
Dept: RADIATION THERAPY | Age: 73
Discharge: HOME | End: 2018-11-15
Payer: MEDICARE

## 2018-11-15 PROCEDURE — 77336 RADIATION PHYSICS CONSULT: CPT

## 2018-11-15 PROCEDURE — 77385 HC IMRT TRMT DLVR SMPL: CPT

## 2018-11-16 ENCOUNTER — HOSPITAL ENCOUNTER (OUTPATIENT)
Dept: RADIATION THERAPY | Age: 73
Discharge: HOME | End: 2018-11-16
Payer: MEDICARE

## 2018-11-16 PROCEDURE — 77385 HC IMRT TRMT DLVR SMPL: CPT

## 2018-11-19 ENCOUNTER — HOSPITAL ENCOUNTER (OUTPATIENT)
Dept: RADIATION THERAPY | Age: 73
Discharge: HOME | End: 2018-11-19
Payer: MEDICARE

## 2018-11-19 PROCEDURE — 77385 HC IMRT TRMT DLVR SMPL: CPT

## 2018-11-20 ENCOUNTER — HOSPITAL ENCOUNTER (OUTPATIENT)
Dept: RADIATION THERAPY | Age: 73
Discharge: HOME | End: 2018-11-20
Payer: MEDICARE

## 2018-11-20 PROCEDURE — 77385 HC IMRT TRMT DLVR SMPL: CPT

## 2018-11-21 ENCOUNTER — HOSPITAL ENCOUNTER (OUTPATIENT)
Dept: RADIATION THERAPY | Age: 73
Discharge: HOME | End: 2018-11-21
Payer: MEDICARE

## 2018-11-21 PROCEDURE — 77385 HC IMRT TRMT DLVR SMPL: CPT

## 2018-11-26 ENCOUNTER — HOSPITAL ENCOUNTER (OUTPATIENT)
Dept: RADIATION THERAPY | Age: 73
Discharge: HOME | End: 2018-11-26
Payer: MEDICARE

## 2018-11-26 PROCEDURE — 77336 RADIATION PHYSICS CONSULT: CPT

## 2018-11-26 PROCEDURE — 77385 HC IMRT TRMT DLVR SMPL: CPT

## 2018-11-27 ENCOUNTER — HOSPITAL ENCOUNTER (OUTPATIENT)
Dept: RADIATION THERAPY | Age: 73
Discharge: HOME | End: 2018-11-27
Payer: MEDICARE

## 2018-11-27 PROCEDURE — 77385 HC IMRT TRMT DLVR SMPL: CPT

## 2018-11-28 ENCOUNTER — HOSPITAL ENCOUNTER (OUTPATIENT)
Dept: RADIATION THERAPY | Age: 73
Discharge: HOME | End: 2018-11-28
Payer: MEDICARE

## 2018-11-28 PROCEDURE — 77385 HC IMRT TRMT DLVR SMPL: CPT

## 2018-11-29 ENCOUNTER — HOSPITAL ENCOUNTER (OUTPATIENT)
Dept: RADIATION THERAPY | Age: 73
Discharge: HOME | End: 2018-11-29
Payer: MEDICARE

## 2018-11-29 PROCEDURE — 77385 HC IMRT TRMT DLVR SMPL: CPT

## 2018-11-30 ENCOUNTER — HOSPITAL ENCOUNTER (OUTPATIENT)
Dept: RADIATION THERAPY | Age: 73
Discharge: HOME | End: 2018-11-30
Payer: MEDICARE

## 2018-11-30 PROCEDURE — 77385 HC IMRT TRMT DLVR SMPL: CPT

## 2018-12-03 ENCOUNTER — HOSPITAL ENCOUNTER (OUTPATIENT)
Dept: RADIATION THERAPY | Age: 73
Discharge: HOME | End: 2018-12-03
Payer: MEDICARE

## 2018-12-03 PROCEDURE — 77336 RADIATION PHYSICS CONSULT: CPT

## 2018-12-03 PROCEDURE — 77385 HC IMRT TRMT DLVR SMPL: CPT

## 2018-12-04 ENCOUNTER — HOSPITAL ENCOUNTER (OUTPATIENT)
Dept: RADIATION THERAPY | Age: 73
Discharge: HOME | End: 2018-12-04
Payer: MEDICARE

## 2018-12-04 PROCEDURE — 77385 HC IMRT TRMT DLVR SMPL: CPT

## 2018-12-05 ENCOUNTER — HOSPITAL ENCOUNTER (OUTPATIENT)
Dept: RADIATION THERAPY | Age: 73
Discharge: HOME | End: 2018-12-05
Payer: MEDICARE

## 2018-12-05 PROCEDURE — 77385 HC IMRT TRMT DLVR SMPL: CPT

## 2018-12-06 ENCOUNTER — HOSPITAL ENCOUNTER (OUTPATIENT)
Dept: RADIATION THERAPY | Age: 73
Discharge: HOME | End: 2018-12-06
Payer: MEDICARE

## 2018-12-06 PROCEDURE — 77385 HC IMRT TRMT DLVR SMPL: CPT

## 2018-12-07 ENCOUNTER — HOSPITAL ENCOUNTER (OUTPATIENT)
Dept: RADIATION THERAPY | Age: 73
Discharge: HOME | End: 2018-12-07
Payer: MEDICARE

## 2018-12-07 PROCEDURE — 77385 HC IMRT TRMT DLVR SMPL: CPT

## 2018-12-10 ENCOUNTER — HOSPITAL ENCOUNTER (OUTPATIENT)
Dept: RADIATION THERAPY | Age: 73
Discharge: HOME | End: 2018-12-10
Payer: MEDICARE

## 2018-12-10 PROCEDURE — 77336 RADIATION PHYSICS CONSULT: CPT

## 2018-12-10 PROCEDURE — 77385 HC IMRT TRMT DLVR SMPL: CPT

## 2019-02-05 ENCOUNTER — HOSPITAL ENCOUNTER (OUTPATIENT)
Dept: RADIATION THERAPY | Age: 74
Discharge: HOME | End: 2019-02-05
Payer: MEDICARE

## 2019-02-05 PROCEDURE — 99211 OFF/OP EST MAY X REQ PHY/QHP: CPT

## 2019-06-05 ENCOUNTER — HOSPITAL ENCOUNTER (OUTPATIENT)
Dept: LAB | Age: 74
Discharge: HOME | End: 2019-06-05
Payer: MEDICARE

## 2019-06-05 LAB
25(OH)D3 SERPL-MCNC: 31.5 NG/ML (ref 30–100)
PSA SERPL-MCNC: 0 NG/ML (ref 0–4)

## 2019-06-05 PROCEDURE — 84403 ASSAY OF TOTAL TESTOSTERONE: CPT

## 2019-06-05 PROCEDURE — 84153 ASSAY OF PSA TOTAL: CPT

## 2019-06-05 PROCEDURE — 82306 VITAMIN D 25 HYDROXY: CPT

## 2019-06-05 PROCEDURE — 36415 COLL VENOUS BLD VENIPUNCTURE: CPT

## 2019-06-06 LAB — TESTOST SERPL-MCNC: 6 NG/DL (ref 264–916)

## 2019-06-12 ENCOUNTER — HOSPITAL ENCOUNTER (OUTPATIENT)
Dept: RADIATION THERAPY | Age: 74
Discharge: HOME | End: 2019-06-12
Payer: MEDICARE

## 2019-06-12 PROCEDURE — 99211 OFF/OP EST MAY X REQ PHY/QHP: CPT

## 2019-07-29 ENCOUNTER — HOSPITAL ENCOUNTER (OUTPATIENT)
Dept: NUCLEAR MEDICINE | Age: 74
Discharge: HOME | End: 2019-07-29
Attending: RADIOLOGY
Payer: MEDICARE

## 2019-07-29 DIAGNOSIS — C61 MALIGNANT NEOPLASM OF PROSTATE (HCC): ICD-10-CM

## 2019-07-29 PROCEDURE — 78306 BONE IMAGING WHOLE BODY: CPT

## 2019-08-09 ENCOUNTER — HOSPITAL ENCOUNTER (OUTPATIENT)
Dept: CARDIAC CATH/INVASIVE PROCEDURES | Age: 74
Discharge: HOME | End: 2019-08-09
Attending: PHYSICIAN ASSISTANT
Payer: MEDICARE

## 2019-08-09 VITALS
HEART RATE: 61 BPM | RESPIRATION RATE: 16 BRPM | WEIGHT: 184 LBS | SYSTOLIC BLOOD PRESSURE: 119 MMHG | OXYGEN SATURATION: 97 % | BODY MASS INDEX: 24.92 KG/M2 | DIASTOLIC BLOOD PRESSURE: 72 MMHG | TEMPERATURE: 98.7 F | HEIGHT: 72 IN

## 2019-08-09 PROCEDURE — 93660 TILT TABLE EVALUATION: CPT

## 2019-08-09 PROCEDURE — 74011250636 HC RX REV CODE- 250/636: Performed by: INTERNAL MEDICINE

## 2019-08-09 RX ORDER — LIDOCAINE 40 MG/G
CREAM TOPICAL
COMMUNITY
End: 2022-06-27 | Stop reason: ALTCHOICE

## 2019-08-09 RX ORDER — SODIUM CHLORIDE 9 MG/ML
20 INJECTION, SOLUTION INTRAVENOUS CONTINUOUS
Status: DISCONTINUED | OUTPATIENT
Start: 2019-08-09 | End: 2019-08-13 | Stop reason: HOSPADM

## 2019-08-09 RX ADMIN — SODIUM CHLORIDE 20 ML/HR: 900 INJECTION, SOLUTION INTRAVENOUS at 08:35

## 2019-08-09 NOTE — PERIOP NOTES
Phase 2 Recovery Summary  Patient arrived to Phase 2  Vitals:    08/09/19 0950 08/09/19 0955 08/09/19 1000 08/09/19 1009   BP: 92/63 130/80 115/80 119/72   Pulse: 75 (!) 59 66 61   Resp:       Temp:       SpO2: 96% 94% 95% 97%   Weight:       Height:           oriented to time, place, person and situation    Lines and Drains  Peripheral Intravenous Line:   Peripheral IV 08/09/19 Right Hand (Active)   Site Assessment Clean, dry, & intact 8/9/2019 10:15 AM   Phlebitis Assessment 0 8/9/2019 10:15 AM   Infiltration Assessment 0 8/9/2019 10:15 AM   Dressing Status Clean, dry, & intact 8/9/2019 10:15 AM   Dressing Type Tape;Transparent 8/9/2019 10:15 AM   Hub Color/Line Status Pink; Infusing 8/9/2019 10:15 AM       Wound  Wound Buttocks (Active)   Number of days: 525          Patient discharged to home with daughter  Kulwant Mcgrath

## 2019-08-09 NOTE — PROGRESS NOTES
TRANSFER - OUT REPORT:    Verbal report given to YancyRN(name) on Mark Bourne being transferred to [facility](unit) for routine progression of care       Report consisted of patient's Situation, Background, Assessment and   Recommendations(SBAR). Information from the following report(s) SBAR was reviewed with the receiving nurse. Opportunity for questions and clarification was provided.       Patient transported with:   [employer]

## 2019-08-09 NOTE — PROGRESS NOTES
[address] [facility]  [address]  [facility]       [phone]            Tilt TABLE REPORT       Number [id number]           Page 1 of 1  Patient Name: Saranya García                                                                          Date: 8/9/2019  Room No: OP   Pt history/ Indications for test: Orthostatic Hypotension    MEDICATIONS:       Vital Signs  TIME POSITION HR/RHYTHM BP  O2SAT %    900 SUPINE 62//85 96   905 SUPINE 56//74 94   910 SUPINE 59//81 91   915 60° HUT 61//77 97   920 60° HUT 63//74 96   925 60° HUT 66/NSR 86/64 97   930 60° HUT 70//71 97   935 60° HUT 68/NSR 98/73 97   940 60° HUT 70/NSR 91/70 97   945 60° HUT 73//68 97   950 60° HUT 75/NSR 92/63 96   955 SUPINE 59//80 94   1000 SUPINE 66//80 95                          Symptoms: No symptoms throughout procedure    I.V.  Fluids: [email or web address]    Medications: None     Complications: None     Conclusion:        Staff Signature: Xin Delacruz MD Signature:_______________________________________ Saranya [facility]  42/67/7315  [phone]

## 2019-08-09 NOTE — DISCHARGE INSTRUCTIONS
Patient Education        Tilt Table Test: About This Test  What is it? A tilt table test is used to check people who have fainted or who often feel lightheaded. The results help your doctor know the cause of your fainting or feeling lightheaded. The test uses a special table that slowly tilts you to an upright position. It checks how your body responds when you change positions. Why is this test done? This test checks what causes your symptoms by monitoring them while changing your position. Your doctor can see if you faint or feel lightheaded because of problems with your heart rate or blood pressure. When people move from a lying position to an upright one, their blood pressure normally drops. But the body adjusts to this. Your nervous system senses changes in body position and controls your heart rate and blood pressure. If the nervous system doesn't work properly, you might feel lightheaded or faint. This can happen if your blood pressure stays too low. Your heart rate also may slow down or speed up. You feel lightheaded because your brain is not getting a normal amount of blood for a short time. This problem is called syncope (say \"BHWA-rms-bxp\"). Syncope might happen during the test when you change to an upright position. How can you prepare for the test?  · Tell your doctor about any medicines you take. Ask your doctor if you need to stop taking any medicines before the test.  · You may be asked to not eat or drink for a few hours before the test.  What happens during the test?  · The test is usually done in a hospital or a cardiologist's office. · You will have small patches or pads attached to your skin. These are sensors that monitor your heart. You will also have a blood pressure cuff on your arm. And you may have an IV. · During the test, you will lie flat on a table that can tilt you up to almost a standing position. You will be strapped securely to the table.   · Your heart rate and blood pressure are checked regularly as the table is tilted up. · You will be asked if you feel any symptoms like nausea, sweating, dizziness, or an abnormal heartbeat. If you don't have any symptoms, you may be given medicine to speed up your heart rate. Then you will be checked for symptoms again. · If you faint during the test, the table will be returned to a flat position. You will be checked closely and taken care of right away by your medical team. Most people wake up right away. What else should you know about the test?  · The test result is normal if your blood pressure stays stable during the test and you do not feel lightheaded or faint. The test result is not normal if your blood pressure drops and you feel lightheaded or faint. These symptoms might happen because of a slow heart rate. How long does the test take? · The test will take about an hour. It may take longer if you get medicine to speed up your heart during the test.  What happens after the test?  · Your heart rate and blood pressure will be checked before you go home. · You may need to have someone drive you home after the test.  · You can probably go back to your usual activities right away. But some people feel a little tired or nauseated  Follow-up care is a key part of your treatment and safety. Be sure to make and go to all appointments, and call your doctor if you are having problems. It's also a good idea to keep a list of the medicines you take. Ask your doctor when you can expect to have your test results. Where can you learn more? Go to http://marielena-vinayak.info/. Enter L848 in the search box to learn more about \"Tilt Table Test: About This Test.\"  Current as of: July 22, 2018  Content Version: 12.1  © 7577-3906 Healthwise, [employer]. Care instructions adapted under license by [employer] (which disclaims liability or warranty for this information).  If you have questions about a medical condition or this instruction, always ask your healthcare professional. [place] [address] any warranty or liability for your use of this information. Patient armband removed and given to patient to take home. Patient was informed of the privacy risks if armband lost or stolen    DISCHARGE SUMMARY from Nurse    PATIENT INSTRUCTIONS:    After general anesthesia or intravenous sedation, for 24 hours or while taking prescription Narcotics:  · Limit your activities  · Do not drive and operate hazardous machinery  · Do not make important personal or business decisions  · Do  not drink alcoholic beverages  · If you have not urinated within 8 hours after discharge, please contact your surgeon on call. Report the following to your surgeon:  · Excessive pain, swelling, redness or odor of or around the surgical area  · Temperature over 100.5  · Nausea and vomiting lasting longer than 4 hours or if unable to take medications  · Any signs of decreased circulation or nerve impairment to extremity: change in color, persistent  numbness, tingling, coldness or increase pain  · Any questions    tions for a healthy lifestyle:    No smoking/ No tobacco products/ Avoid exposure to second hand smoke  Surgeon General's Warning:  Quitting smoking now greatly reduces serious risk to your health. Obesity, smoking, and sedentary lifestyle greatly increases your risk for illness    A healthy diet, regular physical exercise & weight monitoring are important for maintaining a healthy lifestyle    You may be retaining fluid if you have a history of heart failure or if you experience any of the following symptoms:  Weight gain of 3 pounds or more overnight or 5 pounds in a week, increased swelling in our hands or feet or shortness of breath while lying flat in bed. Please call your doctor as soon as you notice any of these symptoms; do not wait until your next office visit.         The discharge information has been reviewed with the patient. The patient verbalized understanding. Discharge medications reviewed with the patient and appropriate educational materials and side effects teaching were provided.   ___________________________________________________________________________________________________________________________________

## 2019-08-09 NOTE — PERIOP NOTES
Pre-Op Summary    Pt arrived via car with family/friend and is oriented to time, place, person and situation. Patient with steady gait with none assistive devices. Visit Vitals  /72 (BP 1 Location: Right arm, BP Patient Position: At rest)   Pulse 62   Temp 98.7 °F (37.1 °C)   Resp 16   Ht 6' (1.829 m)   Wt 83.5 kg (184 lb)   SpO2 94%   BMI 24.95 kg/m²       Peripheral IV located on Right hand . Patients belongings are located with daughter. Patient's point of contact is Dougie Alvarez and their contact number is: [phone]. They will be in the waiting room. They are able to receive medication information. They will be their ride home.

## 2019-08-14 ENCOUNTER — HOSPITAL ENCOUNTER (OUTPATIENT)
Dept: NON INVASIVE DIAGNOSTICS | Age: 74
Discharge: HOME | End: 2019-08-14
Attending: PHYSICIAN ASSISTANT
Payer: MEDICARE

## 2019-08-14 VITALS
SYSTOLIC BLOOD PRESSURE: 120 MMHG | DIASTOLIC BLOOD PRESSURE: 72 MMHG | HEIGHT: 72 IN | WEIGHT: 184 LBS | BODY MASS INDEX: 24.92 KG/M2

## 2019-08-14 DIAGNOSIS — I95.1 ORTHOSTATIC HYPOTENSION: ICD-10-CM

## 2019-08-14 PROCEDURE — 93306 TTE W/DOPPLER COMPLETE: CPT

## 2019-08-15 LAB
ECHO AO ARCH DIAM: 2.63 CM
ECHO AO ASC DIAM: 3.61 CM
ECHO AO ROOT DIAM: 2.78 CM
ECHO IVC SNIFF: 1.47 CM
ECHO LA MAJOR AXIS: 3.24 CM
ECHO LA TO AORTIC ROOT RATIO: 1.17
ECHO LV INTERNAL DIMENSION DIASTOLIC: 4.3 CM (ref 4.2–5.9)
ECHO LV INTERNAL DIMENSION SYSTOLIC: 3.45 CM
ECHO LV IVSD: 1.12 CM (ref 0.6–1)
ECHO LV MASS 2D: 160.5 G (ref 88–224)
ECHO LV MASS INDEX 2D: 78.1 G/M2 (ref 49–115)
ECHO LV POSTERIOR WALL DIASTOLIC: 0.86 CM (ref 0.6–1)
ECHO LVOT DIAM: 2.05 CM
ECHO LVOT PEAK GRADIENT: 9.3 MMHG
ECHO LVOT PEAK VELOCITY: 152.46 CM/S
ECHO LVOT SV: 119.8 ML
ECHO LVOT VTI: 36.31 CM
ECHO MV A VELOCITY: 78.79 CM/S
ECHO MV E DECELERATION TIME (DT): 416.6 MS
ECHO MV E VELOCITY: 59.86 CM/S
ECHO MV E/A RATIO: 0.76
ECHO PULMONARY ARTERY SYSTOLIC PRESSURE (PASP): 23 MMHG
ECHO RA MINOR AXIS: 3.74 CM
ECHO TV REGURGITANT MAX VELOCITY: 225.55 CM/S
ECHO TV REGURGITANT PEAK GRADIENT: 20.3 MMHG

## 2019-09-18 ENCOUNTER — NURSE NAVIGATOR (OUTPATIENT)
Dept: OTHER | Age: 74
End: 2019-09-18

## 2019-09-20 ENCOUNTER — DOCUMENTATION ONLY (OUTPATIENT)
Dept: CARDIOLOGY CLINIC | Age: 74
End: 2019-09-20

## 2019-09-20 ENCOUNTER — OFFICE VISIT (OUTPATIENT)
Dept: CARDIOLOGY CLINIC | Age: 74
End: 2019-09-20

## 2019-09-20 VITALS
BODY MASS INDEX: 24.82 KG/M2 | HEART RATE: 82 BPM | OXYGEN SATURATION: 97 % | DIASTOLIC BLOOD PRESSURE: 65 MMHG | WEIGHT: 183 LBS | SYSTOLIC BLOOD PRESSURE: 105 MMHG

## 2019-09-20 DIAGNOSIS — C61 PROSTATE CANCER (HCC): ICD-10-CM

## 2019-09-20 DIAGNOSIS — I63.9 ACUTE ISCHEMIC STROKE (HCC): ICD-10-CM

## 2019-09-20 DIAGNOSIS — Z87.891 EX-SMOKER: ICD-10-CM

## 2019-09-20 DIAGNOSIS — I10 ACCELERATED HYPERTENSION: Primary | ICD-10-CM

## 2019-09-20 DIAGNOSIS — Z01.818 PREOPERATIVE CLEARANCE: ICD-10-CM

## 2019-09-20 DIAGNOSIS — Z86.73 HISTORY OF STROKE: ICD-10-CM

## 2019-09-20 DIAGNOSIS — R41.3 MEMORY PROBLEM: ICD-10-CM

## 2019-09-20 DIAGNOSIS — I10 ESSENTIAL HYPERTENSION: ICD-10-CM

## 2019-09-20 NOTE — LETTER
9/20/2019 3:17 PM 
 
Mr. Lennox June [address] [id number] Lennox June was seen in our office on September 20, 2019 for cardiac evaluation. From a cardiac standpoint he may proceed with upcoming surgery. Please feel free to contact our office if you have any questions regarding this patient. Sincerely, Lynder Cockayne, MD

## 2019-09-20 NOTE — PROGRESS NOTES
1. Have you been to the ER, urgent care clinic since your last visit? Hospitalized since your last visit? No     2. Have you seen or consulted any other health care providers outside of the [address] [place] [address] since your last visit? Include any pap smears or colon screening.   yes

## 2019-09-28 PROBLEM — Z01.818 PREOPERATIVE CLEARANCE: Status: ACTIVE | Noted: 2019-09-28

## 2019-09-28 PROBLEM — F17.200 NICOTINE DEPENDENCE: Status: RESOLVED | Noted: 2017-08-05 | Resolved: 2019-09-28

## 2019-09-28 PROBLEM — I10 ACCELERATED HYPERTENSION: Status: RESOLVED | Noted: 2017-08-05 | Resolved: 2019-09-28

## 2019-09-28 NOTE — PROGRESS NOTES
Subjective:      Chelsey Ramos is in the office today for cardiac evaluation. He is a 42-year-old man with no known prior cardiac illness that is in preoperative mode for upcoming right hip replacement. He reports no history of known heart disease. He has had no chest pain or shortness of breath. He can walk 2 blocks without limiting dyspnea. He is most limited by his hip pain. He has had no palpitations, near-syncope or syncope. He has had no peripheral swelling. He did recently have some orthostatic symptoms. He reported that when he stood up quickly, he became dizzy. A tilt table exam was done on 8/9/2019. The results were consistent with orthostatic hypotension. He was taken  off 1 of his medications, presumably a prostate medication, and those symptoms  greatly improved. He did have a CVA 2 years ago. His residual is some short-term memory loss and some mild right-sided body weakness. Patient's cardiac risk factors are remote smoking/ tobacco exposure, none x2 years.         Patient Active Problem List    Diagnosis Date Noted    Preoperative clearance 09/28/2019    Prostate cancer ([address] 10/02/2018    Anal polyp 01/15/2018    Colon polyps 01/15/2018    Encounter for screening for malignant neoplasm of colon 01/15/2018    Hypertrophied anal papilla 01/15/2018    Hemorrhoids with complication 24/91/7365    Memory problem 12/01/2017    Pulmonary emphysema ([address] 12/01/2017    Aortic ectasia ([address] 09/25/2017    Diverticulosis of intestine 09/25/2017    Pulmonary nodule 09/25/2017    Bilateral hearing loss 09/08/2017    Depression 09/08/2017    Essential hypertension 09/08/2017    Ex-smoker 09/08/2017    History of stroke 09/08/2017    Microscopic hematuria 09/08/2017    Numerous moles 09/08/2017    Onychomycosis 09/08/2017    Tinnitus of both ears 09/08/2017    Acute ischemic stroke ([address] 08/09/2017    Hematuria due to acute cystitis 08/09/2017    Altered mental status, unspecified 08/06/2017    Altered mental status 08/05/2017    ETOH abuse 08/05/2017     Current Outpatient Medications   Medication Sig Dispense Refill    tamsulosin (FLOMAX) 0.4 mg capsule 0.4 mg.      atorvastatin (LIPITOR) 20 mg tablet Take 20 mg by mouth daily.  escitalopram oxalate (LEXAPRO) 10 mg tablet Take 15 mg by mouth daily. 1.5 tabs      amLODIPine (NORVASC) 10 mg tablet 1 tab(s)      lisinopril (PRINIVIL, ZESTRIL) 40 mg tablet 1 tab(s)      aspirin delayed-release 81 mg tablet Take  by mouth daily.  MULTIVIT-MINS/IRON/FOLIC/LYCOP (CENTRUM ULTRA MEN'S PO) Take  by mouth.  calcium-cholecalciferol, d3, (CALCIUM 600 + D) 600-125 mg-unit tab Take  by mouth.  lidocaine (XYLOCAINE) 4 % topical cream Apply  to affected area.       donepezil (ARICEPT) 5 mg tablet        No Known Allergies  Past Medical History:   Diagnosis Date    AAA (abdominal aortic aneurysm) without rupture (HCC) 12/2017    measues 4.1    BPH with obstruction/lower urinary tract symptoms     Cerebral artery occlusion with cerebral infarction (HCC)     x2    Cirrhosis of liver (HCC)     Elevated PSA     Emphysema lung (HCC)     Emphysema of lung (HCC)     Hematuria, gross     History of ETOH abuse     History of UTI     Hypercholesterolemia     Hypertension     Memory impairment     Osteoarthritis     Prostate CA ([facility] 02/2018    Stopped smoking with greater than 40 pack year history     Stroke ([facility] 08/2017    Mami Kaur [place]      Past Surgical History:   Procedure Laterality Date    COLONOSCOPY N/A 12/6/2017    COLON[facility]COPY [facility] T[facility] at [facility] ENDOSCOPY     Family History   Problem Relation Age of Onset    Hypertension Mother     Heart Disease Mother     Stroke Mother     Heart Disease Father     Elevated Lipids Father     Cancer Father 36        colon     Social History     Tobacco Use   Smoking Status Former Smoker    Types: Cigarettes    Last attempt to quit: 2017    Years since quittin.0   Smokeless Tobacco Never Used          Review of Systems, additional:  Constitutional: negative  Eyes: negative  Respiratory: negative  Cardiovascular: negative  Gastrointestinal: negative  Musculoskeletal:Hip pain  Neurological: negative  Behvioral/Psych: negative  Endocrine: negative  ENT: negative    Objective:     Visit Vitals  /65   Pulse 82   Wt 83 kg (183 lb)   SpO2 97%   BMI 24.82 kg/m²     General:  alert, cooperative, no distress   Chest Wall: inspection normal - no chest wall deformities or tenderness, respiratory effort normal   Lung: clear to auscultation bilaterally   Heart:  normal rate and regular rhythm, S1 and S2 normal, no murmurs noted, no gallops noted, no JVD   Abdomen: soft, non-tender. Bowel sounds normal. No masses,  no organomegaly   Extremities: extremities normal, atraumatic, no cyanosis or edema Skin: no rashes   Neuro: alert, oriented, normal speech, no focal findings or movement disorder noted     EKG: 3/2/2018. Sinus rhythm with frequent PACs. Assessment/Plan:       ICD-10-CM ICD-9-CM    1. History of stroke, 2017      2. Acute ischemic stroke (HCC) I63.9 434.91    3. Essential hypertension, controlled in the office today  I10 401.9    4. [facility]state cancer ([facility] C61 185    5. Preoperative clearance, patient has no symptoms of ischemic heart disease, decompensated heart failure or unstable cardiac rhythm. Echocardiogram done on 2019 demonstrates normal systolic function with no wall motion abnormalities. There was no significant valvular pathology. There is no cardiac contraindication to proposed orthopedic procedure. Z01.818 V72.84    6. Ex-smoker, none x2 years Z87.891 V15.82    7       Cognitive impairment      8 Orthostatic hypotension, patient had a positive tilt table for same on 2019.   Prostate medication discontinued and symptoms improved

## 2019-12-17 ENCOUNTER — HOSPITAL ENCOUNTER (OUTPATIENT)
Dept: LAB | Age: 74
Discharge: HOME | End: 2019-12-17
Payer: MEDICARE

## 2019-12-17 LAB — PSA SERPL-MCNC: 0 NG/ML (ref 0–4)

## 2019-12-17 PROCEDURE — 84153 ASSAY OF PSA TOTAL: CPT

## 2019-12-17 PROCEDURE — 84403 ASSAY OF TOTAL TESTOSTERONE: CPT

## 2019-12-17 PROCEDURE — 36415 COLL VENOUS BLD VENIPUNCTURE: CPT

## 2019-12-19 LAB — TESTOST SERPL-MCNC: 14 NG/DL (ref 264–916)

## 2020-01-03 ENCOUNTER — HOSPITAL ENCOUNTER (OUTPATIENT)
Dept: RADIATION THERAPY | Age: 75
Discharge: HOME | End: 2020-01-03
Payer: MEDICARE

## 2020-01-03 PROCEDURE — 99211 OFF/OP EST MAY X REQ PHY/QHP: CPT

## 2020-12-11 ENCOUNTER — TRANSCRIBE ORDER (OUTPATIENT)
Dept: RADIATION THERAPY | Age: 75
End: 2020-12-11

## 2020-12-11 DIAGNOSIS — C61 MALIGNANT NEOPLASM OF PROSTATE (HCC): Primary | ICD-10-CM

## 2021-03-12 ENCOUNTER — HOSPITAL ENCOUNTER (OUTPATIENT)
Dept: LAB | Age: 76
Discharge: HOME | End: 2021-03-12
Payer: MEDICARE

## 2021-03-12 DIAGNOSIS — C61 MALIGNANT NEOPLASM OF PROSTATE (HCC): ICD-10-CM

## 2021-03-12 LAB — PSA SERPL-MCNC: 0 NG/ML (ref 0–4)

## 2021-03-12 PROCEDURE — 36415 COLL VENOUS BLD VENIPUNCTURE: CPT

## 2021-03-12 PROCEDURE — 84153 ASSAY OF PSA TOTAL: CPT

## 2021-03-12 PROCEDURE — 84403 ASSAY OF TOTAL TESTOSTERONE: CPT

## 2021-03-13 LAB
COMMENT, TESC2: NORMAL
TESTOST SERPL-MCNC: 287 NG/DL (ref 264–916)

## 2021-03-19 ENCOUNTER — TRANSCRIBE ORDER (OUTPATIENT)
Dept: RADIATION THERAPY | Age: 76
End: 2021-03-19

## 2021-03-19 ENCOUNTER — HOSPITAL ENCOUNTER (OUTPATIENT)
Dept: RADIATION THERAPY | Age: 76
Discharge: HOME | End: 2021-03-19
Payer: MEDICARE

## 2021-03-19 DIAGNOSIS — C61 MALIGNANT NEOPLASM OF PROSTATE (HCC): Primary | ICD-10-CM

## 2021-03-19 PROCEDURE — 99211 OFF/OP EST MAY X REQ PHY/QHP: CPT

## 2021-09-01 ENCOUNTER — TRANSCRIBE ORDER (OUTPATIENT)
Dept: RADIATION THERAPY | Age: 76
End: 2021-09-01

## 2021-09-01 DIAGNOSIS — C61 MALIGNANT NEOPLASM OF PROSTATE (HCC): Primary | ICD-10-CM

## 2021-09-10 ENCOUNTER — APPOINTMENT (OUTPATIENT)
Dept: RADIATION THERAPY | Age: 76
End: 2021-09-10

## 2022-03-18 PROBLEM — N30.01 HEMATURIA DUE TO ACUTE CYSTITIS: Status: ACTIVE | Noted: 2017-08-09

## 2022-03-18 PROBLEM — F10.10 ETOH ABUSE: Status: ACTIVE | Noted: 2017-08-05

## 2022-03-18 PROBLEM — R91.1 PULMONARY NODULE: Status: ACTIVE | Noted: 2017-09-25

## 2022-03-18 PROBLEM — D22.9 NUMEROUS MOLES: Status: ACTIVE | Noted: 2017-09-08

## 2022-03-18 PROBLEM — K63.5 COLON POLYPS: Status: ACTIVE | Noted: 2018-01-15

## 2022-03-18 PROBLEM — Z01.818 PREOPERATIVE CLEARANCE: Status: ACTIVE | Noted: 2019-09-28

## 2022-03-18 PROBLEM — I10 ESSENTIAL HYPERTENSION: Status: ACTIVE | Noted: 2017-09-08

## 2022-03-19 PROBLEM — K57.90 DIVERTICULOSIS OF INTESTINE: Status: ACTIVE | Noted: 2017-09-25

## 2022-03-19 PROBLEM — R41.82 ALTERED MENTAL STATUS: Status: ACTIVE | Noted: 2017-08-05

## 2022-03-19 PROBLEM — C61 PROSTATE CANCER (HCC): Status: ACTIVE | Noted: 2018-10-02

## 2022-03-19 PROBLEM — J43.9 PULMONARY EMPHYSEMA (HCC): Status: ACTIVE | Noted: 2017-12-01

## 2022-03-19 PROBLEM — Z12.11 ENCOUNTER FOR SCREENING FOR MALIGNANT NEOPLASM OF COLON: Status: ACTIVE | Noted: 2018-01-15

## 2022-03-19 PROBLEM — R31.29 MICROSCOPIC HEMATURIA: Status: ACTIVE | Noted: 2017-09-08

## 2022-03-19 PROBLEM — R41.3 MEMORY PROBLEM: Status: ACTIVE | Noted: 2017-12-01

## 2022-03-19 PROBLEM — I63.9 ACUTE ISCHEMIC STROKE (HCC): Status: ACTIVE | Noted: 2017-08-09

## 2022-03-19 PROBLEM — I77.819 AORTIC ECTASIA (HCC): Status: ACTIVE | Noted: 2017-09-25

## 2022-03-19 PROBLEM — K62.0 ANAL POLYP: Status: ACTIVE | Noted: 2018-01-15

## 2022-03-19 PROBLEM — H91.93 BILATERAL HEARING LOSS: Status: ACTIVE | Noted: 2017-09-08

## 2022-03-19 PROBLEM — H93.13 TINNITUS OF BOTH EARS: Status: ACTIVE | Noted: 2017-09-08

## 2022-03-19 PROBLEM — Z86.73 HISTORY OF STROKE: Status: ACTIVE | Noted: 2017-09-08

## 2022-03-19 PROBLEM — Z87.891 EX-SMOKER: Status: ACTIVE | Noted: 2017-09-08

## 2022-03-20 PROBLEM — F32.A DEPRESSION: Status: ACTIVE | Noted: 2017-09-08

## 2022-03-20 PROBLEM — K64.8 HEMORRHOIDS WITH COMPLICATION: Status: ACTIVE | Noted: 2018-01-15

## 2022-03-20 PROBLEM — B35.1 ONYCHOMYCOSIS: Status: ACTIVE | Noted: 2017-09-08

## 2022-03-20 PROBLEM — R41.82 ALTERED MENTAL STATUS, UNSPECIFIED: Status: ACTIVE | Noted: 2017-08-06

## 2022-03-20 PROBLEM — K62.89 HYPERTROPHIED ANAL PAPILLA: Status: ACTIVE | Noted: 2018-01-15

## 2022-06-24 ENCOUNTER — TELEPHONE (OUTPATIENT)
Dept: CARDIOLOGY CLINIC | Age: 77
End: 2022-06-24

## 2022-06-24 NOTE — TELEPHONE ENCOUNTER
Attempted to contact pt at [facility], no answer. Lvm for pt to return call to office at [phone]. Will continue to try to contact pt. Re: did pt already have surgical procedure at [facility]? Is appt still needed on Monday? Sarika Fragoso)  2020 12:36:27

## 2022-06-27 ENCOUNTER — OFFICE VISIT (OUTPATIENT)
Dept: CARDIOLOGY CLINIC | Age: 77
End: 2022-06-27
Payer: MEDICARE

## 2022-06-27 VITALS
DIASTOLIC BLOOD PRESSURE: 82 MMHG | HEART RATE: 89 BPM | BODY MASS INDEX: 27.34 KG/M2 | WEIGHT: 201.6 LBS | SYSTOLIC BLOOD PRESSURE: 118 MMHG | OXYGEN SATURATION: 97 % | TEMPERATURE: 97 F

## 2022-06-27 DIAGNOSIS — I63.9 ACUTE ISCHEMIC STROKE (HCC): ICD-10-CM

## 2022-06-27 DIAGNOSIS — I10 ESSENTIAL HYPERTENSION: ICD-10-CM

## 2022-06-27 DIAGNOSIS — J43.9 PULMONARY EMPHYSEMA, UNSPECIFIED EMPHYSEMA TYPE (HCC): ICD-10-CM

## 2022-06-27 DIAGNOSIS — R94.39 ABNORMAL NUCLEAR STRESS TEST: ICD-10-CM

## 2022-06-27 DIAGNOSIS — Z91.89 QUIT USING TOBACCO IN REMOTE PAST: ICD-10-CM

## 2022-06-27 DIAGNOSIS — I71.40 ABDOMINAL AORTIC ANEURYSM (AAA) WITHOUT RUPTURE: ICD-10-CM

## 2022-06-27 DIAGNOSIS — Z01.818 PREOPERATIVE CLEARANCE: Primary | ICD-10-CM

## 2022-06-27 PROCEDURE — G9717 DOC PT DX DEP/BP F/U NT REQ: HCPCS | Performed by: INTERNAL MEDICINE

## 2022-06-27 PROCEDURE — 99204 OFFICE O/P NEW MOD 45 MIN: CPT | Performed by: INTERNAL MEDICINE

## 2022-06-27 PROCEDURE — G8417 CALC BMI ABV UP PARAM F/U: HCPCS | Performed by: INTERNAL MEDICINE

## 2022-06-27 PROCEDURE — G8536 NO DOC ELDER MAL SCRN: HCPCS | Performed by: INTERNAL MEDICINE

## 2022-06-27 PROCEDURE — 3017F COLORECTAL CA SCREEN DOC REV: CPT | Performed by: INTERNAL MEDICINE

## 2022-06-27 PROCEDURE — 1123F ACP DISCUSS/DSCN MKR DOCD: CPT | Performed by: INTERNAL MEDICINE

## 2022-06-27 PROCEDURE — 1101F PT FALLS ASSESS-DOCD LE1/YR: CPT | Performed by: INTERNAL MEDICINE

## 2022-06-27 PROCEDURE — G8427 DOCREV CUR MEDS BY ELIG CLIN: HCPCS | Performed by: INTERNAL MEDICINE

## 2022-06-27 PROCEDURE — G8754 DIAS BP LESS 90: HCPCS | Performed by: INTERNAL MEDICINE

## 2022-06-27 PROCEDURE — G8752 SYS BP LESS 140: HCPCS | Performed by: INTERNAL MEDICINE

## 2022-06-27 NOTE — TELEPHONE ENCOUNTER
Attempted to contact pt at  number, no answer. Lvm for pt to return call to office at [phone]. Will continue to try to contact pt.

## 2022-06-27 NOTE — PATIENT INSTRUCTIONS
Contact [facility] records or radiology for CD of Nuclear Stress Test [phone] or [phone]

## 2022-06-27 NOTE — PROGRESS NOTES
Identified pt with two pt identifiers(name and ). Reviewed record in preparation for visit and have obtained necessary documentation. Lamont Saleh presents today for No chief complaint on file. Pt denies DIZZINESS, SOB, CHEST PAIN/ PRESSURE, FATIGUE/WEAKNESS, HEADACHES, SWELLING. Lamont Saleh preferred language for health care discussion is english/other. Personal Protective Equipment:   Personal Protective Equipment was used including: mask-surgical and hands-gloves. Patient was placed on no precaution(s). Patient was masked. Precautions:   Patient currently on None  Patient currently roomed with door closed. Is someone accompanying this pt? yes    Is the patient using any DME equipment during OV? No     Depression Screening:  No flowsheet data found. Learning Assessment:  Learning Assessment 2019   PRIMARY LEARNER Patient   BARRIERS PRIMARY LEARNER NONE   CO-LEARNER CAREGIVER No   PRIMARY LANGUAGE ENGLISH   LEARNER PREFERENCE PRIMARY VIDEOS   LEARNING SPECIAL TOPICS no   ANSWERED BY patient   RELATIONSHIP SELF       Abuse Screening:  No flowsheet data found. Fall Risk  Fall Risk Assessment, last 12 mths 2019   Able to walk? Yes   Fall in past 12 months? No       Pt currently taking Anticoagulant therapy? No   Pt currently taking Antiplatelet therapy? yes    Coordination of Care:  1. Have you been to the ER, urgent care clinic since your last visit? Hospitalized since your last visit? No     2. Have you seen or consulted any other health care providers outside of the [address] [place] [address] since your last visit? Include any pap smears or colon screening. Yes       Please see Red banners under Allergies and Med Rec to remove outside inquires. All correct information has been verified with patient and added to chart.      Medication's patient's would liked removed has been marked not taking to be removed per Verbal order and read back per Jonathan Durbin MD

## 2022-07-01 ENCOUNTER — TELEPHONE (OUTPATIENT)
Dept: CARDIOLOGY CLINIC | Age: 77
End: 2022-07-01

## 2022-07-01 NOTE — TELEPHONE ENCOUNTER
Daughter called and was seen on 06/27 needs clarification on what to do in between the 3 week period until his next appt.  Patient wants to know why they need to redo the stress test.

## 2022-07-01 NOTE — TELEPHONE ENCOUNTER
Verbal order and read back per Lreoy Murillo MD  Awaiting CD of Nuclear Stress from [facility] for review

## 2022-07-04 PROBLEM — I71.40 ABDOMINAL AORTIC ANEURYSM (AAA) WITHOUT RUPTURE (HCC): Status: ACTIVE | Noted: 2022-07-04

## 2022-07-04 PROBLEM — R94.39 ABNORMAL NUCLEAR STRESS TEST: Status: ACTIVE | Noted: 2022-07-04

## 2022-07-04 PROBLEM — Z91.89 QUIT USING TOBACCO IN REMOTE PAST: Status: ACTIVE | Noted: 2022-07-04

## 2022-07-04 PROBLEM — I71.40 ABDOMINAL AORTIC ANEURYSM (AAA) WITHOUT RUPTURE: Status: ACTIVE | Noted: 2022-07-04

## 2022-07-04 NOTE — PROGRESS NOTES
Subjective:       Vincent Her is in the office today for cardiac evaluation. He is in preoperative mode for upcoming  AAA endograft or possible open repair of a 5.6 cm juxtarenal abdominal aortic aneurysm. The patient's aneurysm measured 4.8 cm in 9/2021. The patient has multiple medical problems as detailed below. I previously saw the patient in September 2019 for preoperative assessment for right hip replacement. An echocardiogram at that time that demonstrated preserved systolic function. He was having symptoms of dizziness. A tilt table was done that was consistent with orthostatic hypotension. His symptoms improved with the discontinuation of one of his prostate medications. In the office today the patient reports that he is doing well. He has had no chest pain or shortness of breath. He has had no palpitations, dizziness, near-syncope or syncope. He has had no PND, orthopnea or peripheral swelling. He believes he could walk a block or up a flight of stairs without limiting dyspnea. He is uncertain if he could carry a bag of groceries up a flight of stairs without limiting dyspnea. He is mostly sedentary. He had a nuclear stress test done on 2/25/2022 which was abnormal.  EF was 84%. There was a moderate sized area of decreased activity in the mid inferior segment which was moderate intensity and the defect was reversible on rest images. Patient's cardiac risk factors are remote smoking/ tobacco exposure, hypertension.         Patient Active Problem List    Diagnosis Date Noted    Quit using tobacco in remote past 07/04/2022    Abdominal aortic aneurysm (AAA) without rupture ([facility] 07/04/2022    Abnormal nuclear stress test 07/04/2022    Preoperative clearance 09/28/2019    Prostate cancer ([facility] 10/02/2018    Anal polyp 01/15/2018    Colon polyps 01/15/2018    Encounter for screening for malignant neoplasm of colon 01/15/2018    Hypertrophied anal papilla 01/15/2018    Hemorrhoids with complication 96/61/1707    Memory problem 12/01/2017    Pulmonary emphysema ([facility] 12/01/2017    Aortic ectasia ([facility] 09/25/2017    Diverticulosis of intestine 09/25/2017    Pulmonary nodule 09/25/2017    Bilateral hearing loss 09/08/2017    Depression 09/08/2017    Essential hypertension 09/08/2017    Ex-smoker 09/08/2017    History of stroke 09/08/2017    Microscopic hematuria 09/08/2017    Numerous moles 09/08/2017    Onychomycosis 09/08/2017    Tinnitus of both ears 09/08/2017    Acute ischemic stroke ([facility] 08/09/2017    Hematuria due to acute cystitis 08/09/2017    Altered mental status, unspecified 08/06/2017    Altered mental status 08/05/2017    ETOH abuse 08/05/2017     Current Outpatient Medications   Medication Sig Dispense Refill    dutasteride (AVODART) 0.5 mg capsule Take 0.5 mg by mouth daily.  calcium-cholecalciferol, d3, (CALCIUM 600 + D) 600-125 mg-unit tab Take 1 Tablet by mouth daily.  donepezil (ARICEPT) 5 mg tablet Take 5 mg by mouth nightly.  atorvastatin (LIPITOR) 20 mg tablet Take 20 mg by mouth daily.  escitalopram oxalate (LEXAPRO) 10 mg tablet Take 15 mg by mouth daily. 1.5 tabs      amLODIPine (NORVASC) 10 mg tablet Take 10 mg by mouth daily.  lisinopril (PRINIVIL, ZESTRIL) 40 mg tablet Take 40 mg by mouth daily.  aspirin delayed-release 81 mg tablet Take  by mouth daily.  MULTIVIT-MINS/IRON/FOLIC/LYCOP (CENTRUM ULTRA MEN'S PO) Take  by mouth.        Allergies   Allergen Reactions    Tamsulosin Other (comments)     Light headness and fainting     Past Medical History:   Diagnosis Date    AAA (abdominal aortic aneurysm) without rupture (HCC) 12/2017    measues 4.1    BPH with obstruction/lower urinary tract symptoms     Cerebral artery occlusion with cerebral infarction (HCC)     x2    Cirrhosis of liver (HCC)     Elevated PSA     Emphysema lung (HCC)     Emphysema of lung (HCC)     Hematuria, gross     History of ETOH abuse     History of UTI     Hypercholesterolemia     Hypertension     Memory impairment     Osteoarthritis     Prostate CA ([facility] [facility] 2018    Stopped smoking with greater than 40 pack year history     Stroke ([facility] [facility] 2017    Mami Saldaña      Past Surgical History:   Procedure Laterality Date    COLONOSCOPY N/A 2017    COLONOSCOPY performed by Costa Jean MD at [facility] ENDOSCOPY     Family History   Problem Relation Age of Onset    Hypertension Mother     Heart Disease Mother     Stroke Mother     Heart Disease Father     Elevated Lipids Father     Cancer Father 36        colon     Social History     Tobacco Use   Smoking Status Former Smoker    Types: Cigarettes    Quit date: 2017    Years since quittin.8   Smokeless Tobacco Never Used          Review of Systems, additional:  Constitutional: negative  Eyes: negative  Respiratory: positive for dyspnea on exertion or emphysema  Cardiovascular: negative  Gastrointestinal: negative  Musculoskeletal:negative  Neurological: negative  Behvioral/Psych: negative  Endocrine: negative  ENT: negative    Objective:     Visit Vitals  /82   Pulse 89   Temp 97 °F (36.1 °C) (Temporal)   Wt 91.4 kg (201 lb 9.6 oz)   SpO2 97%   BMI 27.34 kg/m²     General:  alert, cooperative, no distress   Chest Wall: inspection normal - no chest wall deformities or tenderness, respiratory effort normal   Lung: clear to auscultation bilaterally   Heart:  normal rate and regular rhythm, S1 and S2 normal, no murmurs noted, no gallops noted, no JVD   Abdomen: soft, non-tender. Bowel sounds normal. No masses,  no organomegaly   Extremities: extremities normal, atraumatic, no cyanosis or edema Skin: no rashes   Neuro: alert, oriented, normal speech, no focal findings or movement disorder noted     EKG: {ekg findings:[id number]::\"Normal sinus rhythm, normal ECG\"    Assessment/Plan:       ICD-10-CM ICD-9-CM    1.  Preoperative clearance , has had no symptoms of chest pain suggestive of a cardiac ischemic etiology and no symptoms of decompensated heart failure or unstable cardiac rhythm. Echocardiogram done in August 2019 demonstrated normal systolic function. Nuclear stress test was done more recently (2/25/2022) that was abnormal.  There was a moderate sized area of decreased activity in the mid inferior segment which is moderate intensity and defect is reversible on rest images. Those images/disc was requested with plans to review. Functional capacity is fair. The patient does believe that he would be able to walk a block without limiting dyspnea. He believes he could walk up a flight of steps without limiting dyspnea. He is uncertain if he could carry a bag of groceries up the stairs without limiting dyspnea. Z01.818 V72.84    2. Essential hypertension, controlled in the office today. I10 401.9    3. Quit using tobacco in remote past , none x4-1/2 years. Z91.89 V15.89    4. Pulmonary emphysema, unspecified emphysema type ([facility] [address]  J43.9 492.8    5. Abdominal aortic aneurysm (AAA) without rupture (HCC)  I71.4 441.4    6. Abnormal nuclear stress test , 2/25/2022. Myocardial perfusion study is abnormal.  Normal left ventricular function with EF of 84%. Images reveal moderate sized area of decreased activity in the mid inferior segment which is moderate in intensity and the defect is reversible on rest images. Resting images. Ideally would have endograft in the light of his multiple medical problems. R94.39 794.39    7. Acute ischemic stroke ([facility] [address] , head 9/22/2021. Increased atrophy of a medial temporal lobe. Moderate amount of chronic small vessel disease in both hemispheres with a focal lacunar type infarction on the left in the basal ganglia.  I63.9 434.91

## 2022-07-07 NOTE — TELEPHONE ENCOUNTER
Contacted pts daughter verbalized. Two patient Identifiers confirmed. Advised pts daughter we are awaiting review of CD by provider for clearance. Pts daughter verbalized understanding.

## 2022-07-08 NOTE — TELEPHONE ENCOUNTER
Contacted pts daughter at [facility] number. Two patient Identifiers confirmed. Advised pt per Dr Elier Flaherty. Pts daughter verbalized understanding.

## 2022-07-08 NOTE — TELEPHONE ENCOUNTER
Verbal order and read back per Didier Douglass MD  Cleared for procedure; I have already spoken to surgeon and advised

## 2022-07-08 NOTE — TELEPHONE ENCOUNTER
Attempted to contact pt at  number, no answer. Lvm for pt to return call to office at [phone]. Will continue to try to contact pt.

## 2022-07-13 ENCOUNTER — TELEPHONE (OUTPATIENT)
Dept: CARDIOLOGY CLINIC | Age: 77
End: 2022-07-13

## 2022-07-13 NOTE — TELEPHONE ENCOUNTER
Daughter wants to know if Mr.s Guzman Fears should keep the appt scheduled for 07/27 since she spoke to the nurse last week

## 2022-07-14 NOTE — TELEPHONE ENCOUNTER
Attempted to contact pts daughter at [facility] number, no answer. Mailbox full. Will continue to try to contact.        Re; Appt can be r/s 4 weeks out

## 2022-10-07 ENCOUNTER — OFFICE VISIT (OUTPATIENT)
Dept: CARDIOLOGY CLINIC | Age: 77
End: 2022-10-07
Payer: MEDICARE

## 2022-10-07 VITALS
WEIGHT: 193 LBS | HEART RATE: 85 BPM | TEMPERATURE: 98 F | DIASTOLIC BLOOD PRESSURE: 62 MMHG | OXYGEN SATURATION: 98 % | SYSTOLIC BLOOD PRESSURE: 93 MMHG | BODY MASS INDEX: 26.18 KG/M2 | RESPIRATION RATE: 16 BRPM

## 2022-10-07 DIAGNOSIS — R94.39 ABNORMAL NUCLEAR STRESS TEST: Primary | ICD-10-CM

## 2022-10-07 PROCEDURE — G9717 DOC PT DX DEP/BP F/U NT REQ: HCPCS | Performed by: INTERNAL MEDICINE

## 2022-10-07 PROCEDURE — G8752 SYS BP LESS 140: HCPCS | Performed by: INTERNAL MEDICINE

## 2022-10-07 PROCEDURE — G8427 DOCREV CUR MEDS BY ELIG CLIN: HCPCS | Performed by: INTERNAL MEDICINE

## 2022-10-07 PROCEDURE — G8417 CALC BMI ABV UP PARAM F/U: HCPCS | Performed by: INTERNAL MEDICINE

## 2022-10-07 PROCEDURE — G8754 DIAS BP LESS 90: HCPCS | Performed by: INTERNAL MEDICINE

## 2022-10-07 PROCEDURE — 1123F ACP DISCUSS/DSCN MKR DOCD: CPT | Performed by: INTERNAL MEDICINE

## 2022-10-07 PROCEDURE — 99214 OFFICE O/P EST MOD 30 MIN: CPT | Performed by: INTERNAL MEDICINE

## 2022-10-07 PROCEDURE — G8536 NO DOC ELDER MAL SCRN: HCPCS | Performed by: INTERNAL MEDICINE

## 2022-10-07 PROCEDURE — 1101F PT FALLS ASSESS-DOCD LE1/YR: CPT | Performed by: INTERNAL MEDICINE

## 2022-10-07 RX ORDER — LISINOPRIL 40 MG/1
TABLET ORAL
COMMUNITY

## 2022-10-07 RX ORDER — RIVAROXABAN 15 MG-20MG
KIT ORAL
COMMUNITY
Start: 2022-09-29 | End: 2022-10-07

## 2022-10-07 NOTE — PROGRESS NOTES
Identified pt with two pt identifiers(name and ). Reviewed record in preparation for visit and have obtained necessary documentation. Kleber Shaffer presents today for   Chief Complaint   Patient presents with    Follow-up       Pt c/o SOB. Kleber Shaffer preferred language for health care discussion is english/other. Personal Protective Equipment:   Personal Protective Equipment was used including: mask-surgical and hands-gloves. Patient was placed on no precaution(s). Patient was masked. Precautions:   Patient currently on None  Patient currently roomed with door closed. Is someone accompanying this pt? caregiver    Is the patient using any DME equipment during [address]? no    Depression Screening:  3 most recent PHQ Screens 10/7/2022   Little interest or pleasure in doing things Not at all   Feeling down, depressed, irritable, or hopeless Not at all   Total Score PHQ 2 0       Learning Assessment:  Learning Assessment 2019   PRIMARY LEARNER Patient   BARRIERS PRIMARY LEARNER [address] CAREGIVER No   PRIMARY LANGUAGE ENGLISH   LEARNER PREFERENCE PRIMARY VIDEOS   LEARNING SPECIAL TOPICS no   ANSWERED BY patient   RELATIONSHIP SELF       Abuse Screening:  Abuse Screening Questionnaire 2022   Do you ever feel afraid of your partner? N   Are you in a relationship with someone who physically or mentally threatens you? N   Is it safe for you to go home? Y       Fall Risk  Fall Risk Assessment, last 12 mths 10/7/2022   Able to walk? Yes   Fall in past 12 months? -   Do you feel unsteady? -   Are you worried about falling -       Pt currently taking Anticoagulant therapy? no  Pt currently taking Antiplatelet therapy? yes    Coordination of Care:  1. Have you been to the ER, urgent care clinic since your last visit? Hospitalized since your last visit? sng    2. Have you seen or consulted any other health care providers outside of the [address] [place] [address] since your last visit? Include any pap smears or colon screening. no      Please see Red banners under Allergies and Med Rec to remove outside inquires. All correct information has been verified with patient and added to chart.      Medication's patient's would liked removed has been marked not taking to be removed per Verbal order and read back per Dottie Dorado MD

## 2022-10-16 NOTE — PROGRESS NOTES
Subjective:       Ted Shields is in the office today for cardiac reevaluation. He had a endovascular repair of abdominal aneurysm on 8/22/2022. Patient had increasing shortness of breath and a subsequent CTA of the chest which demonstrated acute and chronic PE. CTA was done on 9/24/2022. He was started on Xarelto at that time. The patient has multiple medical problems as detailed below. I previously saw the patient in September 2019 for preoperative assessment for right hip replacement. An echocardiogram at that time that demonstrated preserved systolic function. He was having symptoms of dizziness. A tilt table was done that was consistent with orthostatic hypotension. His symptoms improved with the discontinuation of one of his prostate medications. In the office today the patient reports that he is doing well. He has had no chest pain. He is trying to stay physically active. He denies shortness of breath. He has had no peripheral swelling. He did have a nuclear stress test done on 2/25/2022 which was abnormal.  EF was 84%. There was a moderate sized area of decreased activity in the mid inferior segment which was moderate intensity and the defect was reversible on rest images. Patient's cardiac risk factors are remote smoking/ tobacco exposure, hypertension.         Patient Active Problem List    Diagnosis Date Noted    Quit using tobacco in remote past 07/04/2022    Abdominal aortic aneurysm (AAA) without rupture ([address] 07/04/2022    Abnormal nuclear stress test 07/04/2022    Preoperative clearance 09/28/2019    Prostate cancer ([address] 10/02/2018    Anal polyp 01/15/2018    Colon polyps 01/15/2018    Encounter for screening for malignant neoplasm of colon 01/15/2018    Hypertrophied anal papilla 01/15/2018    Hemorrhoids with complication 17/37/2869    Memory problem 12/01/2017    Pulmonary emphysema ([address] 12/01/2017    Aortic ectasia ([address] 09/25/2017    Diverticulosis of intestine 09/25/2017 Pulmonary nodule 09/25/2017    Bilateral hearing loss 09/08/2017    Depression 09/08/2017    Essential hypertension 09/08/2017    Ex-smoker 09/08/2017    History of stroke 09/08/2017    Microscopic hematuria 09/08/2017    Numerous moles 09/08/2017    Onychomycosis 09/08/2017    Tinnitus of both ears 09/08/2017    Acute ischemic stroke ([facility] 08/09/2017    Hematuria due to acute cystitis 08/09/2017    Altered mental status, unspecified 08/06/2017    Altered mental status 08/05/2017    ETOH abuse 08/05/2017     Current Outpatient Medications   Medication Sig Dispense Refill    lisinopriL (PRINIVIL, ZESTRIL) 40 mg tablet 1 tab(s)      acetaminophen (TYLENOL) 325 mg tablet Take 650 mg by mouth every four (4) hours as needed. clopidogreL (PLAVIX) 75 mg tab       omeprazole (PRILOSEC) 40 mg capsule       dutasteride (AVODART) 0.5 mg capsule Take 1 Capsule by mouth daily. 90 Capsule 3    solifenacin (VESICARE) 5 mg tablet Take 1 Tablet by mouth daily. 90 Tablet 3    calcium-cholecalciferol, d3, 600-125 mg-unit tab Take 1 Tablet by mouth daily. donepezil (ARICEPT) 5 mg tablet Take 5 mg by mouth nightly. atorvastatin (LIPITOR) 20 mg tablet Take 20 mg by mouth daily. escitalopram oxalate (LEXAPRO) 10 mg tablet Take 15 mg by mouth daily. 1.5 tabs      amLODIPine (NORVASC) 10 mg tablet Take 10 mg by mouth daily. aspirin delayed-release 81 mg tablet Take  by mouth daily. MULTIVIT-MINS/IRON/FOLIC/LYCOP (CENTRUM ULTRA MEN'S PO) Take  by mouth.        Allergies   Allergen Reactions    Tamsulosin Other (comments)     Light headness and fainting  Other reaction(s): other/intolerance  Severally Light headness and fainting     Past Medical History:   Diagnosis Date    AAA (abdominal aortic aneurysm) without rupture 12/2017    measues 4.1    BPH with obstruction/lower urinary tract symptoms     Cerebral artery occlusion with cerebral infarction ([facility] [address]     x2    Cirrhosis of liver (HCC)     Elevated PSA Emphysema lung (HCC)     Emphysema of lung ([facility]     Hematuria, gross     History of ETOH abuse     History of UTI     Hypercholesterolemia     Hypertension     Memory impairment     Osteoarthritis     Prostate CA ([facility] 2018    Stopped smoking with greater than 40 pack year history     Stroke ([facility] 2017    Mami Anne 36      Past Surgical History:   Procedure Laterality Date    COLONOSCOPY N/A 2017    COLONOSCOPY performed by Melita Gonzales MD at [address] [place] [address] ENDOSCOPY     Family History   Problem Relation Age of Onset    Hypertension Mother     Heart Disease Mother     Stroke Mother     Heart Disease Father     Elevated Lipids Father     Cancer Father 36        colon     Social History     Tobacco Use   Smoking Status Former    Types: Cigarettes    Quit date: 2017    Years since quittin.1   Smokeless Tobacco Never          Review of Systems, additional:  Constitutional: negative  Eyes: negative  Respiratory: positive for dyspnea on exertion or emphysema  Cardiovascular: negative  Gastrointestinal: negative  Musculoskeletal:negative  Neurological: negative  Behvioral/Psych: negative  Endocrine: negative  ENT: negative    Objective:     Visit Vitals  BP 93/62   Pulse 85   Temp 98 °F (36.7 °C) (Temporal)   Resp 16   Wt 87.5 kg (193 lb)   SpO2 98%   BMI 26.18 kg/m²     General:  alert, cooperative, no distress   Chest Wall: inspection normal - no chest wall deformities or tenderness, respiratory effort normal   Lung: clear to auscultation bilaterally   Heart:  normal rate and regular rhythm, S1 and S2 normal, no murmurs noted, no gallops noted, no JVD   Abdomen: soft, non-tender. Bowel sounds normal. No masses,  no organomegaly   Extremities: extremities normal, atraumatic, no cyanosis or edema Skin: no rashes   Neuro: alert, oriented, normal speech, no focal findings or movement disorder noted     EK2022. Sinus rhythm with PACs. Nondiagnostic ST and T wave variations.   Abnormal R wave progression    Assessment/Plan:       ICD-10-CM ICD-9-CM    1. Endovascular abdominal aneurysm repair, infrarenal AAA measuring 5.7 x 5.2 cm with trend of enlargement preprocedure. Stable symptoms in the office today. Return in 3 months. Z01.818 V72.84    2. Essential hypertension, controlled in the office today. I10 401.9    3. Quit using tobacco in remote past , none x4-1/2 years. Z91.89 V15.89    4. Pulmonary emphysema, unspecified emphysema type ([address]  J43.9 492.8    5. Abdominal aortic aneurysm (AAA) without rupture (HCC)  I71.4 441.4    6. Abnormal nuclear stress test , 2/25/2022. Myocardial perfusion study is abnormal.  Normal left ventricular function with EF of 84%. Images reveal moderate sized area of decreased activity in the mid inferior segment which is moderate in intensity and the defect is reversible on rest images. Resting images. Ideally would have endograft in the light of his multiple medical problems. R94.39 794.39    7. Acute ischemic stroke ([address] , head 9/22/2021. Increased atrophy of a medial temporal lobe. Moderate amount of chronic small vessel disease in both hemispheres with a focal lacunar type infarction on the left in the basal ganglia. I63.9 434.91    8     Pulmonary thromboembolism, CTA findings of acute and chronic PE on 9/24/2022. Patient on Xarelto and Plavix.

## 2023-04-12 ENCOUNTER — OFFICE VISIT (OUTPATIENT)
Age: 78
End: 2023-04-12

## 2023-04-12 VITALS
SYSTOLIC BLOOD PRESSURE: 118 MMHG | BODY MASS INDEX: 26.99 KG/M2 | DIASTOLIC BLOOD PRESSURE: 80 MMHG | OXYGEN SATURATION: 100 % | TEMPERATURE: 97.7 F | HEART RATE: 104 BPM | WEIGHT: 199 LBS

## 2023-04-12 DIAGNOSIS — I10 ESSENTIAL (PRIMARY) HYPERTENSION: Primary | ICD-10-CM

## 2023-04-19 PROBLEM — D62 ACUTE BLOOD LOSS ANEMIA: Status: ACTIVE | Noted: 2023-04-07

## 2023-04-19 PROBLEM — F10.11 ALCOHOL ABUSE, IN REMISSION: Status: ACTIVE | Noted: 2020-04-14

## 2023-04-19 PROBLEM — R31.0 GROSS HEMATURIA: Status: ACTIVE | Noted: 2017-09-08

## 2023-04-19 PROBLEM — R31.9 HEMATURIA: Status: ACTIVE | Noted: 2023-04-06

## 2023-04-19 PROBLEM — I71.40 ABDOMINAL AORTIC ANEURYSM (AAA) WITHOUT RUPTURE (HCC): Status: ACTIVE | Noted: 2019-02-22

## 2023-04-19 PROBLEM — Z96.641 HISTORY OF RIGHT HIP REPLACEMENT: Status: ACTIVE | Noted: 2019-10-11

## 2023-04-19 PROBLEM — F32.A CHRONIC DEPRESSION: Status: ACTIVE | Noted: 2017-09-08

## 2023-04-19 PROBLEM — R41.3 MEMORY PROBLEM: Status: ACTIVE | Noted: 2017-12-01

## 2023-04-19 PROBLEM — F10.11 H/O ALCOHOL ABUSE: Status: ACTIVE | Noted: 2018-03-07

## 2023-04-19 PROBLEM — C67.9 MALIGNANT NEOPLASM OF URINARY BLADDER (HCC): Status: ACTIVE | Noted: 2018-03-07

## 2023-04-19 PROBLEM — R94.39 ABNORMAL NUCLEAR STRESS TEST: Status: ACTIVE | Noted: 2022-03-18

## 2023-04-19 PROBLEM — I63.9 ACUTE ISCHEMIC STROKE (HCC): Status: ACTIVE | Noted: 2017-08-09

## 2023-04-19 PROBLEM — I10 ESSENTIAL HYPERTENSION: Status: ACTIVE | Noted: 2017-09-08

## 2023-04-19 PROBLEM — R91.1 LUNG NODULE: Status: ACTIVE | Noted: 2017-09-25

## 2023-04-19 PROBLEM — M16.10 HIP ARTHRITIS: Status: ACTIVE | Noted: 2019-09-30

## 2023-04-19 PROBLEM — F17.200 NICOTINE DEPENDENCE: Status: ACTIVE | Noted: 2017-08-05

## 2023-04-19 PROBLEM — J43.9 EMPHYSEMA LUNG (HCC): Status: ACTIVE | Noted: 2017-12-01

## 2023-04-19 PROBLEM — N40.0 BENIGN PROSTATIC HYPERPLASIA WITHOUT LOWER URINARY TRACT SYMPTOMS: Status: ACTIVE | Noted: 2020-04-14

## 2023-04-19 PROBLEM — R19.7 DIARRHEA: Status: ACTIVE | Noted: 2020-02-24

## 2023-04-19 PROBLEM — F33.1 MODERATE EPISODE OF RECURRENT MAJOR DEPRESSIVE DISORDER (HCC): Status: ACTIVE | Noted: 2021-04-16

## 2023-04-19 PROBLEM — N32.9 LESION OF BLADDER: Status: ACTIVE | Noted: 2023-03-27

## 2023-04-19 PROBLEM — E11.3519: Status: ACTIVE | Noted: 2022-03-18

## 2023-06-07 ENCOUNTER — OFFICE VISIT (OUTPATIENT)
Age: 78
End: 2023-06-07
Payer: COMMERCIAL

## 2023-06-07 VITALS
WEIGHT: 202 LBS | SYSTOLIC BLOOD PRESSURE: 122 MMHG | HEART RATE: 78 BPM | BODY MASS INDEX: 27.4 KG/M2 | DIASTOLIC BLOOD PRESSURE: 70 MMHG | OXYGEN SATURATION: 98 %

## 2023-06-07 DIAGNOSIS — I10 ESSENTIAL HYPERTENSION: Primary | ICD-10-CM

## 2023-06-07 PROCEDURE — 3078F DIAST BP <80 MM HG: CPT | Performed by: INTERNAL MEDICINE

## 2023-06-07 PROCEDURE — 3074F SYST BP LT 130 MM HG: CPT | Performed by: INTERNAL MEDICINE

## 2023-06-07 PROCEDURE — 99214 OFFICE O/P EST MOD 30 MIN: CPT | Performed by: INTERNAL MEDICINE

## 2023-06-07 PROCEDURE — 1123F ACP DISCUSS/DSCN MKR DOCD: CPT | Performed by: INTERNAL MEDICINE

## 2023-06-07 ASSESSMENT — PATIENT HEALTH QUESTIONNAIRE - PHQ9
SUM OF ALL RESPONSES TO PHQ QUESTIONS 1-9: 0
1. LITTLE INTEREST OR PLEASURE IN DOING THINGS: 0
SUM OF ALL RESPONSES TO PHQ QUESTIONS 1-9: 0
SUM OF ALL RESPONSES TO PHQ9 QUESTIONS 1 & 2: 0
2. FEELING DOWN, DEPRESSED OR HOPELESS: 0

## 2023-06-19 NOTE — PROGRESS NOTES
Subjective:       Lacie Mckeon is in the office today for cardiac reevaluation. He had a endovascular repair of abdominal aneurysm on 8/22/2022. Patient had increasing shortness of breath and a subsequent CTA of the chest which demonstrated acute and chronic PE. CTA was done on 9/24/2022. He was started on Xarelto at that time. The patient has multiple medical problems as detailed below. I previously saw the patient in September 2019 for preoperative assessment for right hip replacement. An echocardiogram at that time that demonstrated preserved systolic function. He was having symptoms of dizziness. A tilt table was done that was consistent with orthostatic hypotension. His symptoms improved with the discontinuation of one of his prostate medications. In the office today the patient reported that he was taken off the Xarelto recently because of hematuria. He has a history of bladder cancer. He was hospitalized for 3 days. He is following with urology. He had a nuclear stress test done on 2/25/2022 which was abnormal.  EF was 84%. There was a moderate sized area of decreased activity in the mid inferior segment which was moderate intensity and the defect was reversible on rest images. He was asymptomatic in that regard. Patient's cardiac risk factors are remote smoking/ tobacco exposure, hypertension.         Patient Active Problem List    Diagnosis Date Noted    Quit using tobacco in remote past 07/04/2022    Abdominal aortic aneurysm (AAA) without rupture ([address] 07/04/2022    Abnormal nuclear stress test 07/04/2022    Preoperative clearance 09/28/2019    Prostate cancer ([address] 10/02/2018    Anal polyp 01/15/2018    Colon polyps 01/15/2018    Encounter for screening for malignant neoplasm of colon 01/15/2018    Hypertrophied anal papilla 01/15/2018    Hemorrhoids with complication 10/35/9308    Memory problem 12/01/2017    Pulmonary emphysema ([address] 12/01/2017    Aortic ectasia ([address] 09/25/2017

## 2024-04-26 ENCOUNTER — OFFICE VISIT (OUTPATIENT)
Age: 79
End: 2024-04-26
Payer: COMMERCIAL

## 2024-04-26 VITALS — WEIGHT: 200 LBS | HEIGHT: 72 IN | BODY MASS INDEX: 27.09 KG/M2

## 2024-04-26 DIAGNOSIS — Z01.818 PRE-OP EVALUATION: Primary | ICD-10-CM

## 2024-04-26 PROCEDURE — 99214 OFFICE O/P EST MOD 30 MIN: CPT | Performed by: INTERNAL MEDICINE

## 2024-04-26 PROCEDURE — 1123F ACP DISCUSS/DSCN MKR DOCD: CPT | Performed by: INTERNAL MEDICINE

## 2024-04-26 ASSESSMENT — PATIENT HEALTH QUESTIONNAIRE - PHQ9
SUM OF ALL RESPONSES TO PHQ9 QUESTIONS 1 & 2: 0
SUM OF ALL RESPONSES TO PHQ QUESTIONS 1-9: 0
1. LITTLE INTEREST OR PLEASURE IN DOING THINGS: NOT AT ALL
2. FEELING DOWN, DEPRESSED OR HOPELESS: NOT AT ALL
SUM OF ALL RESPONSES TO PHQ QUESTIONS 1-9: 0

## 2024-04-26 NOTE — PROGRESS NOTES
Identified pt with two pt identifiers(name and ). Reviewed record in preparation for visit and have obtained necessary documentation.    Julian Brooks presents today for   Chief Complaint   Patient presents with    Follow-up       Pt c/o, SOB,       Julian Cecilia preferred language for health care discussion is english/other.    Personal Protective Equipment:   Personal Protective Equipment was used including: mask-surgical and hands-gloves. Patient was placed on no precaution(s). Patient was masked.    Precautions:   Patient currently on None  Patient currently roomed with door closed.    Is someone accompanying this pt? cargiver    Is the patient using any DME equipment during OV? walker    Depression Screenin/26/2024     2:29 PM 2023     2:51 PM 10/7/2022     1:02 PM 2022    10:47 AM   PHQ-9 Questionaire   Little interest or pleasure in doing things 0 0 0 0   Feeling down, depressed, or hopeless 0 0 0 0   PHQ-9 Total Score 0 0 0 0        Learning Assessment:  No question data found.    Abuse Screenin/26/2024     2:00 PM 2023     2:00 PM   AMB Abuse Screening   Do you ever feel afraid of your partner? N N   Are you in a relationship with someone who physically or mentally threatens you? N N   Is it safe for you to go home? Y Y          Fall Risk      2024     2:29 PM 2023     2:51 PM 2023     2:18 PM   Fall Risk   2 or more falls in past year? no no no   Fall with injury in past year? no no no         Pt currently taking Anticoagulant /Antiplatelet therapy? aspirin    Coordination of Care:  1. Have you been to the ER, urgent care clinic since your last visit? Hospitalized since your last visit? no    2. Have you seen or consulted any other health care providers outside of the [facility] since your last visit? Include any pap smears or colon screening. no      Please see Red banners under Allergies and Med Rec to remove outside inquires. All correct

## 2024-04-26 NOTE — PROGRESS NOTES
Julian Brooks    Chief Complaint   Patient presents with    Follow-up     Preop    HPI    Julian Brooks is a 78 y.o. with no known heart disease, referred for preop risk stratification prior to elective knee procedure.    As you know he has been having frequent falls due to instability with his knee and now is using a walker.    He follows with my partner, last seen 6/2023. He is a limited historian himself so majority hx taken from chart. Aware he has h/o abnormal stress test and ischemic stroke. He has been managed with conservative therapy as he has no chest pain and normal EF.    He denies CV symptoms completely- no CP, no LE edema, no syncope or heart racing.    He has h/o chronic PE and was on Xarelto but stopped due to hematuria.     Past Medical History:   Diagnosis Date    AAA (abdominal aortic aneurysm) without rupture (HCC) 12/2017    measues 4.1    BPH with obstruction/lower urinary tract symptoms     Cerebral artery occlusion with cerebral infarction (HCC)     x2    Cirrhosis of liver (HCC)     Elevated PSA     Emphysema lung (HCC)     Emphysema of lung (HCC)     Hematuria, gross     History of ETOH abuse     History of UTI     Hypercholesterolemia     Hypertension     Memory impairment     Osteoarthritis     Prostate CA (HCC) 02/2018    Stopped smoking with greater than 40 pack year history     Stroke (HCC) 08/2017    [facility]        Past Surgical History:   Procedure Laterality Date    COLONOSCOPY N/A 12/6/2017    COLONOSCOPY performed by Jake Retana MD at [facility] END[facility]COPY    CYSTOSCOPY  03/13/2024    CYSTOSCOPY;     SELENE JUAREZ MD       Current Outpatient Medications   Medication Sig Dispense Refill    dutasteride (AVODART) 0.5 MG capsule Take 1 capsule by mouth daily 90 capsule 3    aspirin 81 MG EC tablet Take by mouth daily      atorvastatin (LIPITOR) 20 MG tablet Take 1 tablet by mouth daily      Calcium Carbonate-Vitamin D 600-3.125 MG-MCG TABS Take 1 tablet by

## 2024-04-26 NOTE — PATIENT INSTRUCTIONS
Verbal order with read back Deena Connors, DO  Low to moderate risk for you upcoming ARTHROSCOPY KNEE WITH MEDIAL MENISCECTOMY. Hold plavix 75 mg tab five days prior to procedure.

## 2024-05-06 ENCOUNTER — TELEPHONE (OUTPATIENT)
Age: 79
End: 2024-05-06

## 2024-05-06 NOTE — TELEPHONE ENCOUNTER
Sent letter to [facility] regarding cardiac clearance for right knee arthroplasty.   Patient was cleared during his last appointment. Per last office note: \"His risk cannot be optimized or changed  He can proceed at moderate risk from CV standpoint.\"     Clearance form faxed to [phone].

## 2024-11-13 ENCOUNTER — OFFICE VISIT (OUTPATIENT)
Age: 79
End: 2024-11-13

## 2024-11-13 VITALS
SYSTOLIC BLOOD PRESSURE: 138 MMHG | DIASTOLIC BLOOD PRESSURE: 95 MMHG | HEART RATE: 68 BPM | HEIGHT: 72 IN | WEIGHT: 187 LBS | OXYGEN SATURATION: 96 % | BODY MASS INDEX: 25.33 KG/M2

## 2024-11-13 DIAGNOSIS — R94.39 ABNORMAL NUCLEAR STRESS TEST: Primary | ICD-10-CM

## 2024-11-13 DIAGNOSIS — I71.40 ABDOMINAL AORTIC ANEURYSM (AAA) WITHOUT RUPTURE, UNSPECIFIED PART (HCC): ICD-10-CM

## 2024-11-13 NOTE — PROGRESS NOTES
Identified pt with two pt identifiers(name and ). Reviewed record in preparation for visit and have obtained necessary documentation.    Julian Cecilia presents today for   Chief Complaint   Patient presents with    Follow-up     Return in about 6 months (around 10/26/2024) for follow up Dr Tamez.         Pt denied DIZZINESS, SOB, CHEST PAIN/ PRESSURE, FATIGUE/WEAKNESS, HEADACHES, SWELLING.             Julian Cecilia preferred language for health care discussion is english/other.    Personal Protective Equipment:   Personal Protective Equipment was used including: mask-surgical and hands-gloves. Patient was placed on no precaution(s). Patient was not masked.    Precautions:   Patient currently on None  Patient currently roomed with door closed.    Is someone accompanying this pt? yes    Is the patient using any DME equipment during OV? yes    Depression Screenin/26/2024     2:29 PM 2023     2:51 PM 10/7/2022     1:02 PM 2022    10:47 AM   PHQ-9 Questionaire   Little interest or pleasure in doing things 0 0 0 0   Feeling down, depressed, or hopeless 0 0 0 0   PHQ-9 Total Score 0 0 0 0        Learning Assessment:  No question data found.    Abuse Screenin/26/2024     2:00 PM 2023     2:00 PM   AMB Abuse Screening   Do you ever feel afraid of your partner? N N   Are you in a relationship with someone who physically or mentally threatens you? N N   Is it safe for you to go home? Y Y          Fall Risk      2024     2:29 PM 2023     2:51 PM 2023     2:18 PM   Fall Risk   Do you feel unsteady or are you worried about falling?  yes no no   2 or more falls in past year? no no no   Fall with injury in past year? no no no         Pt currently taking Anticoagulant /Antiplatelet therapy? no    Coordination of Care:  1. Have you been to the ER, urgent care clinic since your last visit? Hospitalized since your last visit? no    2. Have you seen or consulted any other health care

## 2024-12-13 NOTE — ANESTHESIA PREPROCEDURE EVALUATION
Anesthetic History   No history of anesthetic complications            Review of Systems / Medical History  Patient summary reviewed and pertinent labs reviewed    Pulmonary    COPD               Neuro/Psych       CVA  TIA     Cardiovascular    Hypertension                   GI/Hepatic/Renal                Endo/Other        Arthritis     Other Findings   Comments:   Risk Factors for Postoperative nausea/vomiting:       History of postoperative nausea/vomiting? NO       Female? NO       Motion sickness? NO       Intended opioid administration for postoperative analgesia? NO      Smoking Abstinence  Current Smoker? NO  Elective Surgery? YES  Seen preoperatively by anesthesiologist or proxy prior to day of surgery? YES  Pt abstained from smoking 24 hours prior to anesthesia?  N/A           Physical Exam    Airway  Mallampati: III  TM Distance: 4 - 6 cm  Neck ROM: normal range of motion   Mouth opening: Normal     Cardiovascular    Rhythm: irregular  Rate: normal         Dental    Dentition: Poor dentition, Upper partial plate and Lower partial plate     Pulmonary  Breath sounds clear to auscultation               Abdominal  GI exam deferred       Other Findings            Anesthetic Plan    ASA: 3  Anesthesia type: MAC            Anesthetic plan and risks discussed with: Patient
no

## 2025-03-14 ENCOUNTER — TELEPHONE (OUTPATIENT)
Age: 80
End: 2025-03-14

## 2025-03-14 NOTE — TELEPHONE ENCOUNTER
Contacted Lanny. Two patient Identifiers confirmed. Advised clearance received awaiting confirmation from provider.  Lanny verbalized understanding.

## 2025-03-14 NOTE — TELEPHONE ENCOUNTER
Lanny from [facility] called to report patient is having a left eye cataract surgery on 3/27. Lanny is asking if Dr. Tamez can provide Cardiac Clearance. Lanny/[facility] can be reached at [phone] for follow up.

## 2025-05-02 ENCOUNTER — OFFICE VISIT (OUTPATIENT)
Age: 80
End: 2025-05-02
Payer: MEDICARE

## 2025-05-02 VITALS
WEIGHT: 188 LBS | OXYGEN SATURATION: 92 % | DIASTOLIC BLOOD PRESSURE: 78 MMHG | HEART RATE: 79 BPM | BODY MASS INDEX: 25.47 KG/M2 | HEIGHT: 72 IN | SYSTOLIC BLOOD PRESSURE: 110 MMHG

## 2025-05-02 DIAGNOSIS — R94.39 ABNORMAL NUCLEAR STRESS TEST: Primary | ICD-10-CM

## 2025-05-02 DIAGNOSIS — I10 ESSENTIAL HYPERTENSION: ICD-10-CM

## 2025-05-02 PROCEDURE — 3078F DIAST BP <80 MM HG: CPT | Performed by: INTERNAL MEDICINE

## 2025-05-02 PROCEDURE — G8417 CALC BMI ABV UP PARAM F/U: HCPCS | Performed by: INTERNAL MEDICINE

## 2025-05-02 PROCEDURE — 99214 OFFICE O/P EST MOD 30 MIN: CPT | Performed by: INTERNAL MEDICINE

## 2025-05-02 PROCEDURE — 1123F ACP DISCUSS/DSCN MKR DOCD: CPT | Performed by: INTERNAL MEDICINE

## 2025-05-02 PROCEDURE — 3074F SYST BP LT 130 MM HG: CPT | Performed by: INTERNAL MEDICINE

## 2025-05-02 PROCEDURE — G8428 CUR MEDS NOT DOCUMENT: HCPCS | Performed by: INTERNAL MEDICINE

## 2025-05-02 PROCEDURE — 1036F TOBACCO NON-USER: CPT | Performed by: INTERNAL MEDICINE

## 2025-05-02 PROCEDURE — 93000 ELECTROCARDIOGRAM COMPLETE: CPT | Performed by: INTERNAL MEDICINE

## 2025-05-02 ASSESSMENT — PATIENT HEALTH QUESTIONNAIRE - PHQ9
8. MOVING OR SPEAKING SO SLOWLY THAT OTHER PEOPLE COULD HAVE NOTICED. OR THE OPPOSITE, BEING SO FIGETY OR RESTLESS THAT YOU HAVE BEEN MOVING AROUND A LOT MORE THAN USUAL: NOT AT ALL
SUM OF ALL RESPONSES TO PHQ QUESTIONS 1-9: 0
2. FEELING DOWN, DEPRESSED OR HOPELESS: NOT AT ALL
9. THOUGHTS THAT YOU WOULD BE BETTER OFF DEAD, OR OF HURTING YOURSELF: NOT AT ALL
SUM OF ALL RESPONSES TO PHQ QUESTIONS 1-9: 0
10. IF YOU CHECKED OFF ANY PROBLEMS, HOW DIFFICULT HAVE THESE PROBLEMS MADE IT FOR YOU TO DO YOUR WORK, TAKE CARE OF THINGS AT HOME, OR GET ALONG WITH OTHER PEOPLE: NOT DIFFICULT AT ALL
SUM OF ALL RESPONSES TO PHQ QUESTIONS 1-9: 0
7. TROUBLE CONCENTRATING ON THINGS, SUCH AS READING THE NEWSPAPER OR WATCHING TELEVISION: NOT AT ALL
SUM OF ALL RESPONSES TO PHQ QUESTIONS 1-9: 0
3. TROUBLE FALLING OR STAYING ASLEEP: NOT AT ALL
5. POOR APPETITE OR OVEREATING: NOT AT ALL
1. LITTLE INTEREST OR PLEASURE IN DOING THINGS: NOT AT ALL
4. FEELING TIRED OR HAVING LITTLE ENERGY: NOT AT ALL
6. FEELING BAD ABOUT YOURSELF - OR THAT YOU ARE A FAILURE OR HAVE LET YOURSELF OR YOUR FAMILY DOWN: NOT AT ALL

## 2025-05-02 NOTE — PROGRESS NOTES
Identified pt with two pt identifiers(name and ). Reviewed record in preparation for visit and have obtained necessary documentation.    Julian Brooks presents today for   Chief Complaint   Patient presents with    Follow-up       pre-op clearance for total knee replacement  Return in about 6 months (around 2025) for follow up with Dr. Tamez.           Pt denies DIZZINESS, SOB, CHEST PAIN/ PRESSURE, FATIGUE/WEAKNESS, HEADACHES, SWELLING.             Julian Brooks preferred language for health care discussion is english/other.    Personal Protective Equipment:   Personal Protective Equipment was used including: mask-surgical and hands-gloves. Patient was placed on no precaution(s). Patient was not masked.    Precautions:   Patient currently on None  Patient currently roomed with door closed.    Is someone accompanying this pt? Isi (caregiver)     Is the patient using any DME equipment during OV? no    Depression Screenin/2/2025     8:24 AM 2024     2:29 PM 2023     2:51 PM 10/7/2022     1:02 PM 2022    10:47 AM   PHQ-9 Questionaire   Little interest or pleasure in doing things 0 0 0 0 0   Feeling down, depressed, or hopeless 0 0 0 0 0   Trouble falling or staying asleep, or sleeping too much 0       Feeling tired or having little energy 0       Poor appetite or overeating 0       Feeling bad about yourself - or that you are a failure or have let yourself or your family down 0       Trouble concentrating on things, such as reading the newspaper or watching television 0       Moving or speaking so slowly that other people could have noticed. Or the opposite - being so fidgety or restless that you have been moving around a lot more than usual 0       Thoughts that you would be better off dead, or of hurting yourself in some way 0       PHQ-9 Total Score 0 0 0 0 0   If you checked off any problems, how difficult have these problems made it for you to do your work, take care of things at

## 2025-05-02 NOTE — PATIENT INSTRUCTIONS
Verbal order with read back Torsten Tamez MD.  Cleared for total knee surgery. Paperwork to be faxed to surgeon.  
no

## 2025-05-02 NOTE — PROGRESS NOTES
[facility] Follow-Up Note    Assessment/Plan:     Assessment & Plan  1. Premature atrial contractions.  - The patient has experienced premature atrial contractions (PACs) which are present on his ECG today. This is not atrial fibrillation, and he has been asymptomatic in that regard.. He has been on aspirin and Plavix, both of which were stopped 5 days ago on April 28, 2025.  He had a history of pulmonary thromboembolism in the past and took a full course of anticoagulants at that time.  He is advised to continue his other medications for his hypertension.    2. Preoperative evaluation for right knee replacement surgery.  No cardiac contraindication to proposed surgical procedure.  - The patient is scheduled for a right knee replacement surgery due to increasing pain and stiffness, which makes it hard to walk. The surgery will be performed at [facility]. He is advised to follow the surgeon's instructions regarding the resumption of aspirin and Plavix post-surgery.     ECHOCARDIOGRAM COMPLETE  5/14/2019  Narrative    CONCLUSIONS    * Left ventricular wall motion, systolic function, diastolic function, and chamber size are normal. Left ventricular ejection fraction is 64%.    * Right ventricular systolic function and chamber size are normal.    * No pulmonary hypertension, estimated pulmonary arterial systolic pressure is 29 mmHg.    * No hemodynamically significant valvular disease.    Comparison    * No significant change since prior study from 8/10/2017.     The patient had a nuclear stress test done in 2022 which showed possibly a very small area of ischemia in the mid inferior segment.  He has had been absolutely asymptomatic in that regard.  His ejection fraction was normal with no wall motion abnormalities at that time  The patient has had no symptoms suggestive of cardiac ischemic pain and no symptoms of congestive heart failure or unstable cardiac rhythm.  His exercise tolerance is  No

## 2025-08-22 ENCOUNTER — OFFICE VISIT (OUTPATIENT)
Age: 80
End: 2025-08-22
Payer: MEDICARE

## 2025-08-22 VITALS
SYSTOLIC BLOOD PRESSURE: 112 MMHG | DIASTOLIC BLOOD PRESSURE: 72 MMHG | WEIGHT: 186 LBS | BODY MASS INDEX: 25.19 KG/M2 | HEIGHT: 72 IN | OXYGEN SATURATION: 98 % | HEART RATE: 78 BPM

## 2025-08-22 DIAGNOSIS — I10 ESSENTIAL HYPERTENSION: Primary | ICD-10-CM

## 2025-08-22 PROCEDURE — G8417 CALC BMI ABV UP PARAM F/U: HCPCS | Performed by: INTERNAL MEDICINE

## 2025-08-22 PROCEDURE — 1123F ACP DISCUSS/DSCN MKR DOCD: CPT | Performed by: INTERNAL MEDICINE

## 2025-08-22 PROCEDURE — 99214 OFFICE O/P EST MOD 30 MIN: CPT | Performed by: INTERNAL MEDICINE

## 2025-08-22 PROCEDURE — 1036F TOBACCO NON-USER: CPT | Performed by: INTERNAL MEDICINE

## 2025-08-22 PROCEDURE — 3074F SYST BP LT 130 MM HG: CPT | Performed by: INTERNAL MEDICINE

## 2025-08-22 PROCEDURE — 3078F DIAST BP <80 MM HG: CPT | Performed by: INTERNAL MEDICINE

## 2025-08-22 PROCEDURE — G8427 DOCREV CUR MEDS BY ELIG CLIN: HCPCS | Performed by: INTERNAL MEDICINE

## 2025-08-22 ASSESSMENT — PATIENT HEALTH QUESTIONNAIRE - PHQ9
8. MOVING OR SPEAKING SO SLOWLY THAT OTHER PEOPLE COULD HAVE NOTICED. OR THE OPPOSITE, BEING SO FIGETY OR RESTLESS THAT YOU HAVE BEEN MOVING AROUND A LOT MORE THAN USUAL: NOT AT ALL
DEPRESSION UNABLE TO ASSESS: FUNCTIONAL CAPACITY MOTIVATION LIMITS ACCURACY
SUM OF ALL RESPONSES TO PHQ QUESTIONS 1-9: 0
3. TROUBLE FALLING OR STAYING ASLEEP: NOT AT ALL
SUM OF ALL RESPONSES TO PHQ QUESTIONS 1-9: 0
9. THOUGHTS THAT YOU WOULD BE BETTER OFF DEAD, OR OF HURTING YOURSELF: NOT AT ALL
5. POOR APPETITE OR OVEREATING: NOT AT ALL
10. IF YOU CHECKED OFF ANY PROBLEMS, HOW DIFFICULT HAVE THESE PROBLEMS MADE IT FOR YOU TO DO YOUR WORK, TAKE CARE OF THINGS AT HOME, OR GET ALONG WITH OTHER PEOPLE: NOT DIFFICULT AT ALL
SUM OF ALL RESPONSES TO PHQ QUESTIONS 1-9: 0
SUM OF ALL RESPONSES TO PHQ QUESTIONS 1-9: 0
1. LITTLE INTEREST OR PLEASURE IN DOING THINGS: NOT AT ALL
2. FEELING DOWN, DEPRESSED OR HOPELESS: NOT AT ALL
6. FEELING BAD ABOUT YOURSELF - OR THAT YOU ARE A FAILURE OR HAVE LET YOURSELF OR YOUR FAMILY DOWN: NOT AT ALL
4. FEELING TIRED OR HAVING LITTLE ENERGY: NOT AT ALL
7. TROUBLE CONCENTRATING ON THINGS, SUCH AS READING THE NEWSPAPER OR WATCHING TELEVISION: NOT AT ALL

## 2025-08-22 ASSESSMENT — LIFESTYLE VARIABLES
HOW OFTEN DO YOU HAVE A DRINK CONTAINING ALCOHOL: NEVER
HOW MANY STANDARD DRINKS CONTAINING ALCOHOL DO YOU HAVE ON A TYPICAL DAY: PATIENT DOES NOT DRINK

## (undated) DEVICE — FORCEPS BX L240CM JAW DIA2.8MM L CAP W/ NDL MIC MESH TOOTH

## (undated) DEVICE — FLEX ADVANTAGE 1500CC: Brand: FLEX ADVANTAGE

## (undated) DEVICE — SYR IRR CATH TIP LR ADPT 70ML -- CONVERT TO ITEM 363120

## (undated) DEVICE — STERILE POLYISOPRENE POWDER-FREE SURGICAL GLOVES: Brand: PROTEXIS

## (undated) DEVICE — HARMONIC HAND PIECE BLUE --

## (undated) DEVICE — DEVICE INFL 60ML 12ATM CONVENIENT LOK REL HNDL HI PRSS FLX

## (undated) DEVICE — SLEEVE COMPR STD 12 IN FOR 165IN CALF COMFORT VENODYNE SYS

## (undated) DEVICE — CONTAINER PREFIL FRMLN 15ML --

## (undated) DEVICE — MEDI-VAC NON-CONDUCTIVE SUCTION TUBING: Brand: CARDINAL HEALTH

## (undated) DEVICE — SPONGE GZ W4XL4IN COT 12 PLY TYP VII WVN C FLD DSGN

## (undated) DEVICE — Device

## (undated) DEVICE — REM POLYHESIVE ADULT PATIENT RETURN ELECTRODE: Brand: VALLEYLAB

## (undated) DEVICE — KENDALL SCD EXPRESS SLEEVES, KNEE LENGTH, MEDIUM: Brand: KENDALL SCD

## (undated) DEVICE — PAD,ABDOMINAL,5"X9",STERILE,LF,1/PK: Brand: MEDLINE INDUSTRIES, INC.

## (undated) DEVICE — SUT CHRMC 3-0 27IN SH BRN --

## (undated) DEVICE — STERILE LATEX POWDER-FREE SURGICAL GLOVESWITH NITRILE COATING: Brand: PROTEXIS

## (undated) DEVICE — FLEXIBLE YANKAUER,MEDIUM TIP, NO VACUUM CONTROL: Brand: ARGYLE

## (undated) DEVICE — SHEAR HARMONIC FOCUS OEM 9CM --

## (undated) DEVICE — KENDALL 500 SERIES DIAPHORETIC FOAM MONITORING ELECTRODE - TEAR DROP SHAPE ( 30/PK): Brand: KENDALL

## (undated) DEVICE — TRAY PREP DRY W/ PREM GLV 2 APPL 6 SPNG 2 UNDPD 1 OVERWRAP

## (undated) DEVICE — SYR 10ML CTRL LR LCK NSAF LF --

## (undated) DEVICE — NEEDLE HYPO 25GA L1.5IN BVL ORIENTED ECLIPSE

## (undated) DEVICE — HEX-LOCKING BLADE ELECTRODE: Brand: EDGE

## (undated) DEVICE — SKIN MARKER,REGULAR TIP WITH RULER AND LABELS: Brand: DEVON

## (undated) DEVICE — SOLUTION IV 1000ML 0.9% SOD CHL

## (undated) DEVICE — NEEDLE HYPO 25GA L1.5IN BLU POLYPR HUB S STL REG BVL STR

## (undated) DEVICE — BASIN EMESIS 500CC ROSE 250/CS 60/PLT: Brand: MEDEGEN MEDICAL PRODUCTS, LLC

## (undated) DEVICE — SYR 50ML SLIP TIP NSAF LF STRL --

## (undated) DEVICE — SNARE POLYP OVAL TIP 30X55MM -- LARIAT

## (undated) DEVICE — KIT COLON W/ 1.1OZ LUB AND 2 END